# Patient Record
Sex: FEMALE | Race: WHITE | Employment: UNEMPLOYED | URBAN - METROPOLITAN AREA
[De-identification: names, ages, dates, MRNs, and addresses within clinical notes are randomized per-mention and may not be internally consistent; named-entity substitution may affect disease eponyms.]

---

## 2019-09-10 ENCOUNTER — OFFICE VISIT (OUTPATIENT)
Dept: FAMILY MEDICINE CLINIC | Age: 59
End: 2019-09-10
Payer: COMMERCIAL

## 2019-09-10 VITALS
DIASTOLIC BLOOD PRESSURE: 103 MMHG | HEART RATE: 84 BPM | SYSTOLIC BLOOD PRESSURE: 170 MMHG | HEIGHT: 63 IN | TEMPERATURE: 97.7 F | RESPIRATION RATE: 16 BRPM | WEIGHT: 264.2 LBS | OXYGEN SATURATION: 99 % | BODY MASS INDEX: 46.81 KG/M2

## 2019-09-10 DIAGNOSIS — R42 DIZZINESS: ICD-10-CM

## 2019-09-10 DIAGNOSIS — Z23 NEED FOR 23-POLYVALENT PNEUMOCOCCAL POLYSACCHARIDE VACCINE: ICD-10-CM

## 2019-09-10 DIAGNOSIS — L40.9 PSORIASIS: ICD-10-CM

## 2019-09-10 DIAGNOSIS — I10 ESSENTIAL HYPERTENSION: Primary | ICD-10-CM

## 2019-09-10 PROCEDURE — 3017F COLORECTAL CA SCREEN DOC REV: CPT | Performed by: FAMILY MEDICINE

## 2019-09-10 PROCEDURE — 93000 ELECTROCARDIOGRAM COMPLETE: CPT | Performed by: FAMILY MEDICINE

## 2019-09-10 PROCEDURE — 99203 OFFICE O/P NEW LOW 30 MIN: CPT | Performed by: FAMILY MEDICINE

## 2019-09-10 PROCEDURE — G8427 DOCREV CUR MEDS BY ELIG CLIN: HCPCS | Performed by: FAMILY MEDICINE

## 2019-09-10 PROCEDURE — G8417 CALC BMI ABV UP PARAM F/U: HCPCS | Performed by: FAMILY MEDICINE

## 2019-09-10 PROCEDURE — 1036F TOBACCO NON-USER: CPT | Performed by: FAMILY MEDICINE

## 2019-09-10 RX ORDER — LISINOPRIL 5 MG/1
TABLET ORAL
Qty: 60 TABLET | Refills: 0 | Status: SHIPPED | OUTPATIENT
Start: 2019-09-10 | End: 2019-09-25

## 2019-09-10 ASSESSMENT — ENCOUNTER SYMPTOMS
SHORTNESS OF BREATH: 0
ABDOMINAL PAIN: 0
PHOTOPHOBIA: 0
COLOR CHANGE: 1

## 2019-09-10 ASSESSMENT — PATIENT HEALTH QUESTIONNAIRE - PHQ9
1. LITTLE INTEREST OR PLEASURE IN DOING THINGS: 0
SUM OF ALL RESPONSES TO PHQ9 QUESTIONS 1 & 2: 0
SUM OF ALL RESPONSES TO PHQ QUESTIONS 1-9: 0
SUM OF ALL RESPONSES TO PHQ QUESTIONS 1-9: 0
2. FEELING DOWN, DEPRESSED OR HOPELESS: 0

## 2019-09-10 NOTE — PROGRESS NOTES
Subjective:      Patient ID: John Doherty is a 61 y.o. female    Hypertension   This is a new problem. The current episode started more than 1 year ago. Pertinent negatives include no shortness of breath. Past treatments include nothing. Dizziness   Associated symptoms include a rash. Pertinent negatives include no abdominal pain, arthralgias or weakness. Here to establish. Has had about a week hx of intermittent dizziness. Seemed to start when rolling over in bed. Dizziness has gotten better last 3 days. No nausea or emesis. No vision changes. No weakness of arms or legs. Did have dental cleaning done prior to start of dizziness. No fever. No recent illness. No recent medical care as she had no medical insurance. Would like to see dermatologist as does have long hx of psoriasis. Review of Systems   Constitutional: Negative for unexpected weight change. Eyes: Negative for photophobia. Respiratory: Negative for shortness of breath. Gastrointestinal: Negative for abdominal pain. Musculoskeletal: Negative for arthralgias. Skin: Positive for color change and rash. Neurological: Positive for dizziness. Negative for syncope and weakness.      Reviewed allergy, medical, social, surgical, family and med list changes and updated   Files     Social History     Socioeconomic History    Marital status:      Spouse name: None    Number of children: None    Years of education: None    Highest education level: None   Occupational History    None   Social Needs    Financial resource strain: None    Food insecurity:     Worry: None     Inability: None    Transportation needs:     Medical: None     Non-medical: None   Tobacco Use    Smoking status: Former Smoker     Packs/day: 1.00     Years: 7.00     Pack years: 7.00     Types: Cigarettes     Last attempt to quit: 1984     Years since quittin.7    Smokeless tobacco: Never Used   Substance and Sexual Activity   

## 2019-09-13 DIAGNOSIS — R42 DIZZINESS: ICD-10-CM

## 2019-09-13 DIAGNOSIS — I10 ESSENTIAL HYPERTENSION: ICD-10-CM

## 2019-09-13 DIAGNOSIS — L40.9 PSORIASIS: ICD-10-CM

## 2019-09-13 LAB
ALBUMIN SERPL-MCNC: 3.7 G/DL (ref 3.5–4.6)
ALP BLD-CCNC: 82 U/L (ref 40–130)
ALT SERPL-CCNC: 13 U/L (ref 0–33)
ANION GAP SERPL CALCULATED.3IONS-SCNC: 10 MEQ/L (ref 9–15)
AST SERPL-CCNC: 17 U/L (ref 0–35)
BASOPHILS ABSOLUTE: 0.1 K/UL (ref 0–0.2)
BASOPHILS RELATIVE PERCENT: 0.8 %
BILIRUB SERPL-MCNC: 0.4 MG/DL (ref 0.2–0.7)
BUN BLDV-MCNC: 14 MG/DL (ref 6–20)
CALCIUM SERPL-MCNC: 9.3 MG/DL (ref 8.5–9.9)
CHLORIDE BLD-SCNC: 105 MEQ/L (ref 95–107)
CHOLESTEROL, TOTAL: 202 MG/DL (ref 0–199)
CO2: 26 MEQ/L (ref 20–31)
CREAT SERPL-MCNC: 0.55 MG/DL (ref 0.5–0.9)
EOSINOPHILS ABSOLUTE: 0.5 K/UL (ref 0–0.7)
EOSINOPHILS RELATIVE PERCENT: 7.4 %
GFR AFRICAN AMERICAN: >60
GFR NON-AFRICAN AMERICAN: >60
GLOBULIN: 3.3 G/DL (ref 2.3–3.5)
GLUCOSE BLD-MCNC: 89 MG/DL (ref 70–99)
HCT VFR BLD CALC: 40.7 % (ref 37–47)
HDLC SERPL-MCNC: 53 MG/DL (ref 40–59)
HEMOGLOBIN: 13 G/DL (ref 12–16)
LDL CHOLESTEROL CALCULATED: 124 MG/DL (ref 0–129)
LYMPHOCYTES ABSOLUTE: 2.5 K/UL (ref 1–4.8)
LYMPHOCYTES RELATIVE PERCENT: 37.9 %
MCH RBC QN AUTO: 30.1 PG (ref 27–31.3)
MCHC RBC AUTO-ENTMCNC: 32 % (ref 33–37)
MCV RBC AUTO: 94.2 FL (ref 82–100)
MONOCYTES ABSOLUTE: 0.4 K/UL (ref 0.2–0.8)
MONOCYTES RELATIVE PERCENT: 6.6 %
NEUTROPHILS ABSOLUTE: 3.1 K/UL (ref 1.4–6.5)
NEUTROPHILS RELATIVE PERCENT: 47.3 %
PDW BLD-RTO: 14.4 % (ref 11.5–14.5)
PLATELET # BLD: 214 K/UL (ref 130–400)
POTASSIUM SERPL-SCNC: 4.2 MEQ/L (ref 3.4–4.9)
RBC # BLD: 4.32 M/UL (ref 4.2–5.4)
SODIUM BLD-SCNC: 141 MEQ/L (ref 135–144)
TOTAL PROTEIN: 7 G/DL (ref 6.3–8)
TRIGL SERPL-MCNC: 125 MG/DL (ref 0–150)
WBC # BLD: 6.5 K/UL (ref 4.8–10.8)

## 2019-09-25 ENCOUNTER — OFFICE VISIT (OUTPATIENT)
Dept: FAMILY MEDICINE CLINIC | Age: 59
End: 2019-09-25
Payer: COMMERCIAL

## 2019-09-25 VITALS
SYSTOLIC BLOOD PRESSURE: 115 MMHG | HEIGHT: 63 IN | OXYGEN SATURATION: 98 % | HEART RATE: 90 BPM | RESPIRATION RATE: 16 BRPM | BODY MASS INDEX: 46.78 KG/M2 | WEIGHT: 264 LBS | DIASTOLIC BLOOD PRESSURE: 72 MMHG | TEMPERATURE: 97.6 F

## 2019-09-25 DIAGNOSIS — E78.00 PURE HYPERCHOLESTEROLEMIA: ICD-10-CM

## 2019-09-25 DIAGNOSIS — Z12.11 COLON CANCER SCREENING: ICD-10-CM

## 2019-09-25 DIAGNOSIS — I10 ESSENTIAL HYPERTENSION: Primary | ICD-10-CM

## 2019-09-25 DIAGNOSIS — Z12.39 BREAST CANCER SCREENING: ICD-10-CM

## 2019-09-25 PROCEDURE — 99213 OFFICE O/P EST LOW 20 MIN: CPT | Performed by: FAMILY MEDICINE

## 2019-09-25 PROCEDURE — G8417 CALC BMI ABV UP PARAM F/U: HCPCS | Performed by: FAMILY MEDICINE

## 2019-09-25 PROCEDURE — 1036F TOBACCO NON-USER: CPT | Performed by: FAMILY MEDICINE

## 2019-09-25 PROCEDURE — 3017F COLORECTAL CA SCREEN DOC REV: CPT | Performed by: FAMILY MEDICINE

## 2019-09-25 PROCEDURE — G8427 DOCREV CUR MEDS BY ELIG CLIN: HCPCS | Performed by: FAMILY MEDICINE

## 2019-09-25 RX ORDER — CLOBETASOL PROPIONATE 0.5 MG/G
OINTMENT TOPICAL
Refills: 2 | COMMUNITY
Start: 2019-09-16

## 2019-09-25 RX ORDER — NEOMYCIN SULFATE, POLYMYXIN B SULFATE AND DEXAMETHASONE 3.5; 10000; 1 MG/ML; [USP'U]/ML; MG/ML
SUSPENSION/ DROPS OPHTHALMIC
Refills: 0 | COMMUNITY
Start: 2019-09-17 | End: 2020-08-13

## 2019-09-25 RX ORDER — LISINOPRIL 10 MG/1
10 TABLET ORAL DAILY
Qty: 30 TABLET | Refills: 0 | Status: SHIPPED | OUTPATIENT
Start: 2019-09-25 | End: 2019-11-04 | Stop reason: SDUPTHER

## 2019-09-25 ASSESSMENT — ENCOUNTER SYMPTOMS
SHORTNESS OF BREATH: 0
ABDOMINAL PAIN: 0

## 2019-09-25 NOTE — PROGRESS NOTES
MG tablet     Sig: Take 1 tablet by mouth daily     Dispense:  30 tablet     Refill:  0      F/u after non fasting blood work in 4 weeks

## 2019-10-21 DIAGNOSIS — I10 ESSENTIAL HYPERTENSION: ICD-10-CM

## 2019-10-21 LAB
ANION GAP SERPL CALCULATED.3IONS-SCNC: 13 MEQ/L (ref 9–15)
BUN BLDV-MCNC: 20 MG/DL (ref 6–20)
CALCIUM SERPL-MCNC: 9.1 MG/DL (ref 8.5–9.9)
CHLORIDE BLD-SCNC: 99 MEQ/L (ref 95–107)
CO2: 28 MEQ/L (ref 20–31)
CREAT SERPL-MCNC: 0.62 MG/DL (ref 0.5–0.9)
GFR AFRICAN AMERICAN: >60
GFR NON-AFRICAN AMERICAN: >60
GLUCOSE BLD-MCNC: 85 MG/DL (ref 70–99)
POTASSIUM SERPL-SCNC: 4.3 MEQ/L (ref 3.4–4.9)
SODIUM BLD-SCNC: 140 MEQ/L (ref 135–144)

## 2019-10-29 ENCOUNTER — OFFICE VISIT (OUTPATIENT)
Dept: FAMILY MEDICINE CLINIC | Age: 59
End: 2019-10-29

## 2019-10-29 DIAGNOSIS — F48.9 NO DIAGNOSIS ON AXIS I: Primary | ICD-10-CM

## 2019-11-04 DIAGNOSIS — I10 HYPERTENSION, UNSPECIFIED TYPE: Primary | ICD-10-CM

## 2019-11-04 RX ORDER — LISINOPRIL 10 MG/1
10 TABLET ORAL DAILY
Qty: 30 TABLET | Refills: 2 | Status: SHIPPED | OUTPATIENT
Start: 2019-11-04 | End: 2019-12-17 | Stop reason: SDUPTHER

## 2019-12-12 ENCOUNTER — TELEPHONE (OUTPATIENT)
Dept: ADMINISTRATIVE | Age: 59
End: 2019-12-12

## 2019-12-17 ENCOUNTER — OFFICE VISIT (OUTPATIENT)
Dept: FAMILY MEDICINE CLINIC | Age: 59
End: 2019-12-17
Payer: COMMERCIAL

## 2019-12-17 VITALS
HEIGHT: 63 IN | HEART RATE: 90 BPM | TEMPERATURE: 97.8 F | OXYGEN SATURATION: 97 % | WEIGHT: 264 LBS | BODY MASS INDEX: 46.78 KG/M2 | DIASTOLIC BLOOD PRESSURE: 72 MMHG | SYSTOLIC BLOOD PRESSURE: 120 MMHG | RESPIRATION RATE: 16 BRPM

## 2019-12-17 DIAGNOSIS — I10 HYPERTENSION, UNSPECIFIED TYPE: ICD-10-CM

## 2019-12-17 LAB
BILIRUBIN, POC: NORMAL
BLOOD URINE, POC: NORMAL
CLARITY, POC: CLEAR
COLOR, POC: YELLOW
GLUCOSE URINE, POC: NORMAL
KETONES, POC: NORMAL
LEUKOCYTE EST, POC: NORMAL
NITRITE, POC: NORMAL
PH, POC: 6
PROTEIN, POC: NORMAL
SPECIFIC GRAVITY, POC: 1.02
UROBILINOGEN, POC: 3.5

## 2019-12-17 PROCEDURE — 81002 URINALYSIS NONAUTO W/O SCOPE: CPT | Performed by: FAMILY MEDICINE

## 2019-12-17 PROCEDURE — 99213 OFFICE O/P EST LOW 20 MIN: CPT | Performed by: FAMILY MEDICINE

## 2019-12-17 RX ORDER — LISINOPRIL 10 MG/1
10 TABLET ORAL DAILY
Qty: 30 TABLET | Refills: 5 | Status: SHIPPED | OUTPATIENT
Start: 2019-12-17 | End: 2020-06-15 | Stop reason: SDUPTHER

## 2020-01-14 ENCOUNTER — OFFICE VISIT (OUTPATIENT)
Dept: FAMILY MEDICINE CLINIC | Age: 60
End: 2020-01-14
Payer: COMMERCIAL

## 2020-01-14 VITALS
DIASTOLIC BLOOD PRESSURE: 72 MMHG | HEIGHT: 64 IN | WEIGHT: 273.6 LBS | SYSTOLIC BLOOD PRESSURE: 114 MMHG | TEMPERATURE: 97.8 F | OXYGEN SATURATION: 98 % | HEART RATE: 87 BPM | BODY MASS INDEX: 46.71 KG/M2 | RESPIRATION RATE: 16 BRPM

## 2020-01-14 PROCEDURE — 99213 OFFICE O/P EST LOW 20 MIN: CPT | Performed by: PHYSICIAN ASSISTANT

## 2020-01-14 ASSESSMENT — ENCOUNTER SYMPTOMS
EYE PAIN: 0
NAUSEA: 0
BACK PAIN: 1
WHEEZING: 0
SHORTNESS OF BREATH: 0
DIARRHEA: 0
VOMITING: 0
SORE THROAT: 0
CONSTIPATION: 0
COUGH: 0

## 2020-01-14 NOTE — PROGRESS NOTES
Subjective  Farshad Lundberg, 61 y.o. female presents today with:  Chief Complaint   Patient presents with    Rash     Patient c/o rash on her stomach x4 days. Patient states it is painful. Patient states the only thing different that she took is metamucil. Patient states the pain travels up her back.  Health Maintenance     Declines all will f/u with dr. Rikki He       HPI   Patient is here being seen acutely for complaint of a rash on left upper abdomen that she noticed about 2 days ago   she  has eczema but states this rash has a  different appearance - it is itchy but not burning having a separate pain in her left upper back which feels heavy  She has been having some GI complaints belching frequently bloated states she can feel her bowels moving. denies constipation using Metamucil for the last 2 to 3 days - no change in sxs    Review of Systems   Constitutional: Negative for fatigue. HENT: Negative for congestion, ear pain and sore throat. Eyes: Negative for pain. Respiratory: Negative for cough, shortness of breath and wheezing. Gastrointestinal: Negative for constipation, diarrhea, nausea and vomiting. Genitourinary: Negative for dysuria. Musculoskeletal: Positive for back pain. Negative for neck pain. Skin: Positive for rash. Allergic/Immunologic: Negative for environmental allergies and food allergies. Neurological: Negative for dizziness and headaches. Psychiatric/Behavioral: Negative for sleep disturbance. No past medical history on file. No past surgical history on file.   Social History     Socioeconomic History    Marital status:      Spouse name: Not on file    Number of children: Not on file    Years of education: Not on file    Highest education level: Not on file   Occupational History    Not on file   Social Needs    Financial resource strain: Not on file    Food insecurity:     Worry: Not on file     Inability: Not on file   [employer] SpO2: 98%   Weight: 273 lb 9.6 oz (124.1 kg)   Height: 5' 4\" (1.626 m)     Physical Exam  Constitutional:       Comments: Morbid obesity   HENT:      Head: Normocephalic and atraumatic. Eyes:      Extraocular Movements: Extraocular movements intact. Conjunctiva/sclera: Conjunctivae normal.      Pupils: Pupils are equal, round, and reactive to light. Cardiovascular:      Rate and Rhythm: Normal rate and regular rhythm. Pulmonary:      Effort: Pulmonary effort is normal. No respiratory distress. Breath sounds: Normal breath sounds. No wheezing or rhonchi. Abdominal:      Palpations: There is no mass. Tenderness: There is no tenderness. Musculoskeletal:         General: No swelling, tenderness or deformity. Skin:     General: Skin is warm and dry. Findings: Erythema and rash present. Comments: Erythematous vesicular  rash left upper abdomen   Neurological:      General: No focal deficit present. Mental Status: She is alert and oriented to person, place, and time. Psychiatric:         Mood and Affect: Mood is anxious. Assessment & Plan    Diagnosis Orders   1. Generalized abdominal pain  XR ABDOMEN (complete, including decubitus and/or erect views)   2. Herpes zoster without complication      Patient declined treatment         Orders Placed This Encounter   Procedures    XR ABDOMEN (complete, including decubitus and/or erect views)     Standing Status:   Future     Number of Occurrences:   1     Standing Expiration Date:   1/14/2021     Order Specific Question:   Reason for exam:     Answer:   abdominal discomfort     No orders of the defined types were placed in this encounter. There are no discontinued medications. Return for follow up with your PCP as directed, call for results.     Francia Rome PA-C

## 2020-06-15 ENCOUNTER — OFFICE VISIT (OUTPATIENT)
Dept: FAMILY MEDICINE CLINIC | Age: 60
End: 2020-06-15
Payer: COMMERCIAL

## 2020-06-15 VITALS
DIASTOLIC BLOOD PRESSURE: 80 MMHG | HEIGHT: 64 IN | HEART RATE: 79 BPM | BODY MASS INDEX: 47.12 KG/M2 | RESPIRATION RATE: 14 BRPM | OXYGEN SATURATION: 98 % | TEMPERATURE: 97.6 F | SYSTOLIC BLOOD PRESSURE: 118 MMHG | WEIGHT: 276 LBS

## 2020-06-15 PROCEDURE — 99214 OFFICE O/P EST MOD 30 MIN: CPT | Performed by: FAMILY MEDICINE

## 2020-06-15 RX ORDER — LISINOPRIL 10 MG/1
10 TABLET ORAL DAILY
Qty: 30 TABLET | Refills: 5 | Status: SHIPPED | OUTPATIENT
Start: 2020-06-15 | End: 2021-02-09

## 2020-06-15 ASSESSMENT — PATIENT HEALTH QUESTIONNAIRE - PHQ9
1. LITTLE INTEREST OR PLEASURE IN DOING THINGS: 0
2. FEELING DOWN, DEPRESSED OR HOPELESS: 0
SUM OF ALL RESPONSES TO PHQ9 QUESTIONS 1 & 2: 0
SUM OF ALL RESPONSES TO PHQ QUESTIONS 1-9: 0
SUM OF ALL RESPONSES TO PHQ QUESTIONS 1-9: 0

## 2020-06-15 ASSESSMENT — ENCOUNTER SYMPTOMS
ABDOMINAL PAIN: 0
COUGH: 0
NAUSEA: 0
VOMITING: 0

## 2020-06-15 NOTE — PROGRESS NOTES
Inability: None    Transportation needs     Medical: None     Non-medical: None   Tobacco Use    Smoking status: Former Smoker     Packs/day: 1.00     Years: 7.00     Pack years: 7.00     Types: Cigarettes     Last attempt to quit: 1984     Years since quittin.4    Smokeless tobacco: Never Used   Substance and Sexual Activity    Alcohol use: None    Drug use: None    Sexual activity: None   Lifestyle    Physical activity     Days per week: None     Minutes per session: None    Stress: None   Relationships    Social connections     Talks on phone: None     Gets together: None     Attends Moravian service: None     Active member of club or organization: None     Attends meetings of clubs or organizations: None     Relationship status: None    Intimate partner violence     Fear of current or ex partner: None     Emotionally abused: None     Physically abused: None     Forced sexual activity: None   Other Topics Concern    None   Social History Narrative    None     Current Outpatient Medications   Medication Sig Dispense Refill    lisinopril (PRINIVIL;ZESTRIL) 10 MG tablet Take 1 tablet by mouth daily 30 tablet 5    clobetasol (TEMOVATE) 0.05 % ointment Apply to arms & legs twice a day for up to 3 weeks, take 1 week off. Repeat for flare  2    neomycin-polymyxin-dexameth (MAXITROL) 3.5-40259-9.1 ophthalmic suspension Instill 1 (ONE) drop into eye(s) FOUR TIMES DAILY for 1 (ONE) week & taper AS DIRECTED  0    Multiple Vitamins-Minerals (ONE-A-DAY WOMENS 50+ ADVANTAGE PO) Take by mouth       No current facility-administered medications for this visit. History reviewed. No pertinent family history. History reviewed. No pertinent past medical history. Objective:   Physical Exam  Neck:no carotid bruits. No masses. No adenopathy. No thyroid asymmetry. Lungs:clear and equal breath sounds. No wheezes or rales. Heart:rate reg. No murmur.   No gallops   Pulses:Radials 2+ equal

## 2020-06-29 ENCOUNTER — TELEPHONE (OUTPATIENT)
Dept: FAMILY MEDICINE CLINIC | Age: 60
End: 2020-06-29

## 2020-06-30 NOTE — TELEPHONE ENCOUNTER
If not able to create order from front office-, initial  Message or request  can be sent to ma in future  so that order can be   Placed and then submitted for approval by me ---other wise multiple messages are placed and sometimes several days go by without anything being ordered.  -thanks

## 2020-07-22 ENCOUNTER — HOSPITAL ENCOUNTER (OUTPATIENT)
Dept: ULTRASOUND IMAGING | Age: 60
Discharge: HOME | End: 2020-07-24
Payer: COMMERCIAL

## 2020-07-22 ENCOUNTER — HOSPITAL ENCOUNTER (OUTPATIENT)
Dept: WOMENS IMAGING | Age: 60
Discharge: HOME | End: 2020-07-24
Payer: COMMERCIAL

## 2020-07-22 PROCEDURE — 76705 ECHO EXAM OF ABDOMEN: CPT

## 2020-07-22 PROCEDURE — 77067 SCR MAMMO BI INCL CAD: CPT

## 2020-08-13 ENCOUNTER — OFFICE VISIT (OUTPATIENT)
Dept: OBGYN CLINIC | Age: 60
End: 2020-08-13
Payer: COMMERCIAL

## 2020-08-13 VITALS
SYSTOLIC BLOOD PRESSURE: 118 MMHG | DIASTOLIC BLOOD PRESSURE: 78 MMHG | BODY MASS INDEX: 51.07 KG/M2 | RESPIRATION RATE: 20 BRPM | HEIGHT: 63 IN | HEART RATE: 84 BPM | WEIGHT: 288.2 LBS

## 2020-08-13 PROCEDURE — 99386 PREV VISIT NEW AGE 40-64: CPT | Performed by: OBSTETRICS & GYNECOLOGY

## 2020-08-13 RX ORDER — ACETAMINOPHEN 500 MG
1000 TABLET ORAL EVERY 6 HOURS PRN
COMMUNITY

## 2020-08-13 ASSESSMENT — ENCOUNTER SYMPTOMS
VOMITING: 0
BLOOD IN STOOL: 0
RESPIRATORY NEGATIVE: 1
CONSTIPATION: 0
ABDOMINAL DISTENTION: 0
DIARRHEA: 0
EYES NEGATIVE: 1
NAUSEA: 0
ALLERGIC/IMMUNOLOGIC NEGATIVE: 1
ABDOMINAL PAIN: 0
ANAL BLEEDING: 0
RECTAL PAIN: 0

## 2020-08-13 NOTE — PROGRESS NOTES
Subjective:      Patient ID: Kishore Huertas is a 61 y.o. female    Annual exam. New patient; reviewed medical, surgical, social and family history. Also reviewed current medications and allergies. No PMB. Pap performed and screening mammogram ordered. Monthly SBE encouraged. Screening colonoscopy recommended per routine. F/U annual exam or prn. Pt also wished to discuss rash in groin folds and 's h/o HSV extending her appointment time by 15 minutes. Discussed HSV pathophysiology and transmission. All questions answered. Patient without h/o outbreaks. Also discussed fungal infections and odor under pannus. Discussed keeping area clean and dry and anti-fungal powders. All questions answered. Vitals:  /78   Pulse 84   Resp 20   Ht 5' 3\" (1.6 m)   Wt 288 lb 3.2 oz (130.7 kg)   LMP 01/01/2015 (Within Months)   BMI 51.05 kg/m²   History reviewed. No pertinent past medical history. Past Surgical History:   Procedure Laterality Date    CARPAL TUNNEL RELEASE      TONSILLECTOMY       Allergies:  Patient has no known allergies. Current Outpatient Medications   Medication Sig Dispense Refill    acetaminophen (TYLENOL) 500 MG tablet Take 1,000 mg by mouth every 6 hours as needed for Pain      lisinopril (PRINIVIL;ZESTRIL) 10 MG tablet Take 1 tablet by mouth daily 30 tablet 5    clobetasol (TEMOVATE) 0.05 % ointment Apply to arms & legs twice a day for up to 3 weeks, take 1 week off. Repeat for flare  2    Multiple Vitamins-Minerals (ONE-A-DAY WOMENS 50+ ADVANTAGE PO) Take by mouth       No current facility-administered medications for this visit.       Social History     Socioeconomic History    Marital status:      Spouse name: Not on file    Number of children: Not on file    Years of education: Not on file    Highest education level: Not on file   Occupational History    Not on file   Social Needs    Financial resource strain: Not on file    Food insecurity Worry: Not on file     Inability: Not on file    Transportation needs     Medical: Not on file     Non-medical: Not on file   Tobacco Use    Smoking status: Former Smoker     Packs/day: 1.00     Years: 7.00     Pack years: 7.00     Types: Cigarettes     Last attempt to quit: 1984     Years since quittin.6    Smokeless tobacco: Never Used   Substance and Sexual Activity    Alcohol use: Not Currently    Drug use: Never    Sexual activity: Yes     Partners: Male   Lifestyle    Physical activity     Days per week: Not on file     Minutes per session: Not on file    Stress: Not on file   Relationships    Social connections     Talks on phone: Not on file     Gets together: Not on file     Attends Adventist service: Not on file     Active member of club or organization: Not on file     Attends meetings of clubs or organizations: Not on file     Relationship status: Not on file    Intimate partner violence     Fear of current or ex partner: Not on file     Emotionally abused: Not on file     Physically abused: Not on file     Forced sexual activity: Not on file   Other Topics Concern    Not on file   Social History Narrative    Not on file     Family History   Problem Relation Age of Onset    Heart Attack Paternal Grandmother     Stroke Maternal Grandmother     Cancer Maternal Grandmother     Heart Disease Father     Hypertension Father     Diabetes Mother     Heart Disease Mother     Liver Cancer Brother     Diabetes Sister     Heart Defect Sister        Review of Systems   Constitutional: Negative. Negative for activity change, appetite change, chills, diaphoresis, fatigue, fever and unexpected weight change. HENT: Negative. Eyes: Negative. Respiratory: Negative. Cardiovascular: Negative. Gastrointestinal: Negative for abdominal distention, abdominal pain, anal bleeding, blood in stool, constipation, diarrhea, nausea, rectal pain and vomiting. Endocrine: Negative. Genitourinary: Negative for decreased urine volume, difficulty urinating, dyspareunia, dysuria, enuresis, flank pain, frequency, genital sores, hematuria, menstrual problem, pelvic pain, urgency, vaginal bleeding, vaginal discharge and vaginal pain. Musculoskeletal: Negative. Skin: Positive for rash. Allergic/Immunologic: Negative. Neurological: Negative. Hematological: Negative. Psychiatric/Behavioral: Negative. Objective:     Physical Exam  Constitutional:       Appearance: She is well-developed. HENT:      Head: Normocephalic. Neck:      Musculoskeletal: Normal range of motion and neck supple. Thyroid: No thyromegaly. Cardiovascular:      Rate and Rhythm: Normal rate and regular rhythm. Heart sounds: Normal heart sounds. Pulmonary:      Effort: Pulmonary effort is normal. No respiratory distress. Breath sounds: Normal breath sounds. No wheezing or rales. Chest:      Chest wall: No tenderness. Breasts:         Right: No mass, nipple discharge, skin change or tenderness. Left: No mass, nipple discharge, skin change or tenderness. Abdominal:      General: Bowel sounds are normal. There is no distension. Palpations: Abdomen is soft. There is no mass. Tenderness: There is no abdominal tenderness. There is no guarding or rebound. Hernia: There is no hernia in the left inguinal area. Genitourinary:     Labia:         Right: No rash, tenderness, lesion or injury. Left: No rash, tenderness, lesion or injury. Vagina: Normal. No signs of injury and foreign body. No vaginal discharge, erythema, tenderness or bleeding. Cervix: No cervical motion tenderness, discharge or friability. Uterus: Not deviated, not enlarged, not fixed and not tender. Adnexa:         Right: No mass, tenderness or fullness. Left: No mass, tenderness or fullness. Rectum: Normal.   Musculoskeletal: Normal range of motion. General: No tenderness. Lymphadenopathy:      Cervical: No cervical adenopathy. Skin:     General: Skin is warm and dry. Coloration: Skin is not pale. Findings: No erythema or rash. Neurological:      Mental Status: She is alert and oriented to person, place, and time. Psychiatric:         Behavior: Behavior normal.         Thought Content: Thought content normal.         Judgment: Judgment normal.         Assessment:       Diagnosis Orders   1. Women's annual routine gynecological examination  PAP SMEAR   2. Screening for human papillomavirus  PAP SMEAR   3. Screening mammogram, encounter for  ROB DIGITAL SCREEN W OR WO CAD BILATERAL   4. Osteoporosis screening  DEXA BONE DENSITY AXIAL SKELETON   5. Postmenopausal  DEXA BONE DENSITY AXIAL SKELETON        Plan:      Medications placedthis encounter:  No orders of the defined types were placed in this encounter. Orders placedthis encounter:  Orders Placed This Encounter   Procedures    DEXA BONE DENSITY AXIAL SKELETON     Standing Status:   Future     Standing Expiration Date:   8/5/2021    ROB DIGITAL SCREEN W OR WO CAD BILATERAL     Standing Status:   Future     Standing Expiration Date:   8/13/2021    PAP SMEAR     Standing Status:   Future     Standing Expiration Date:   8/5/2021     Order Specific Question:   Collection Type     Answer: Thin Prep     Order Specific Question:   Prior Abnormal Pap Test     Answer:   No     Order Specific Question:   Screening or Diagnostic     Answer:   Screening     Order Specific Question:   HPV Requested? Answer:   Yes     Comments:   16/18     Order Specific Question:   High Risk Patient     Answer:   N/A         Follow up:  Return in about 1 year (around 8/13/2021) for Annual.   Repeat Annual GYN exam every 1 year. Cervical Cytology exam starts age 24. If Negative Cytology;  follow-up screening per current guidelines. Mammograms yearly starting at age 36.     Calcium and Vitamin D dosing reviewed ( age appropriate ). Colonoscopy and bone density screening discussed ( age appropriate ). Birth control and STD prevention discussed ( age appropriate ). Gardisil counseling completed for all patients 7-35 yo. Routine health maintenance ( per PCP and guidelines ).

## 2020-08-26 ENCOUNTER — APPOINTMENT (OUTPATIENT)
Dept: ULTRASOUND IMAGING | Age: 60
End: 2020-08-26
Payer: COMMERCIAL

## 2020-08-27 ENCOUNTER — TELEPHONE (OUTPATIENT)
Dept: FAMILY MEDICINE CLINIC | Age: 60
End: 2020-08-27

## 2020-08-27 ENCOUNTER — HOSPITAL ENCOUNTER (OUTPATIENT)
Dept: WOMENS IMAGING | Age: 60
Discharge: HOME | End: 2020-08-29
Payer: COMMERCIAL

## 2020-08-27 ENCOUNTER — HOSPITAL ENCOUNTER (OUTPATIENT)
Dept: ULTRASOUND IMAGING | Age: 60
Discharge: HOME | End: 2020-08-29
Payer: COMMERCIAL

## 2020-08-27 PROCEDURE — 76642 ULTRASOUND BREAST LIMITED: CPT

## 2020-08-27 PROCEDURE — G0279 TOMOSYNTHESIS, MAMMO: HCPCS

## 2020-08-27 NOTE — TELEPHONE ENCOUNTER
Krish Pat from [facility] called, pt was given an US after mammogram but there is no order, requesting you put an order in for Left Breast US Limited ; reason abnormal R92.8. Thank you.

## 2020-08-28 NOTE — TELEPHONE ENCOUNTER
Test is already done because radiologist wanted it done; the tech transcribed the order but the order it is not electronically signed, need a signed US Left Breast order and Rolemagdy Flores will attach to original.

## 2020-09-15 ENCOUNTER — VIRTUAL VISIT (OUTPATIENT)
Dept: FAMILY MEDICINE CLINIC | Age: 60
End: 2020-09-15
Payer: COMMERCIAL

## 2020-09-15 PROCEDURE — 99213 OFFICE O/P EST LOW 20 MIN: CPT | Performed by: FAMILY MEDICINE

## 2020-09-15 ASSESSMENT — PATIENT HEALTH QUESTIONNAIRE - PHQ9
SUM OF ALL RESPONSES TO PHQ QUESTIONS 1-9: 0
SUM OF ALL RESPONSES TO PHQ QUESTIONS 1-9: 0
1. LITTLE INTEREST OR PLEASURE IN DOING THINGS: 0
SUM OF ALL RESPONSES TO PHQ9 QUESTIONS 1 & 2: 0
2. FEELING DOWN, DEPRESSED OR HOPELESS: 0

## 2020-09-15 ASSESSMENT — ENCOUNTER SYMPTOMS
VOMITING: 0
NAUSEA: 0
ABDOMINAL DISTENTION: 0

## 2020-09-15 NOTE — PROGRESS NOTES
needed for Pain      lisinopril (PRINIVIL;ZESTRIL) 10 MG tablet Take 1 tablet by mouth daily 30 tablet 5    clobetasol (TEMOVATE) 0.05 % ointment Apply to arms & legs twice a day for up to 3 weeks, take 1 week off. Repeat for flare  2    Multiple Vitamins-Minerals (ONE-A-DAY WOMENS 50+ ADVANTAGE PO) Take by mouth       No current facility-administered medications for this visit. Family History   Problem Relation Age of Onset    Heart Attack Paternal Grandmother     Stroke Maternal Grandmother     Cancer Maternal Grandmother     Heart Disease Father     Hypertension Father     Diabetes Mother     Heart Disease Mother     Liver Cancer Brother     Diabetes Sister     Heart Defect Sister      Past Medical History:   Diagnosis Date    Hypertension     Obesity        TELEHEALTH EVALUATION -- Audio/Visual (During GDEIX-11 public health emergency)    -   Zina Hickey is a 61 y.o. female being evaluated by a Virtual Visit (video visit) encounter to address concerns as mentioned above. A caregiver was present when appropriate. Due to this being a TeleHealth encounter (During CHI-12 public health emergency), evaluation of the following organ systems was limited: Vitals/Constitutional/EENT/Resp/CV/GI//MS/Neuro/Skin/Heme-Lymph-Imm. Pursuant to the emergency declaration under the [address] [place] [address] authority and the [employer] Act, this Virtual Visit was conducted with patient's (and/or legal guardian's) consent, to reduce the patient's risk of exposure to COVID-19 and provide necessary medical care. The patient (and/or legal guardian) has also been advised to contact this office for worsening conditions or problems, and seek emergency medical treatment and/or call 911 if deemed necessary. patient accepts tele health video visit via [employer]. me and associated charges   Visit about 15 min   Services were provided through a video synchronous discussion virtually to substitute for in-person clinic visit. Type of encounter was _x_ Doxy __ MyChart ___Facetime    Patient was located at their home. Provider was located at their _x__ home or        ____ office. --Lencho Corbett MD on 9/15/2020 at 11:51 AM    An electronic signature was used to authenticate this note. Objective:   LMP 01/01/2015 (Within Months)     Physical Exam  Gen:  No acute distress  Mental status; Alert. Awake  Affect appropriate. Neuro; No focal deficits. No facial asymmetries; Assessment:       Diagnosis Orders   1. Calculus of gallbladder without cholecystitis without obstruction     2.  Colon cancer screening  Fara Choudhury MD, Gastroenterology, [place]         Plan:      Orders Placed This Encounter   Procedures   Erik Bustos MD, Gastroenterology, [place]     Referral Priority:   Routine     Referral Type:   Eval and Treat     Referral Reason:   Specialty Services Required     Referred to Provider:   Hilary May MD     Requested Specialty:   Gastroenterology     Number of Visits Requested:   1   patient would like to hold on surgery referral for gall bladder-no symptoms  Fasting blood work soon-if blood work stable -f/u in 6 months

## 2020-09-21 ENCOUNTER — TELEPHONE (OUTPATIENT)
Dept: ENDOSCOPY | Age: 60
End: 2020-09-21

## 2020-09-21 NOTE — TELEPHONE ENCOUNTER
called in prescription for Suprep bowel prep kit to [facility] in [facility] 9/21/2020 at 1:53pm

## 2020-09-24 DIAGNOSIS — E78.00 PURE HYPERCHOLESTEROLEMIA: ICD-10-CM

## 2020-09-24 DIAGNOSIS — I10 HYPERTENSION, UNSPECIFIED TYPE: ICD-10-CM

## 2020-09-24 LAB
ALBUMIN SERPL-MCNC: 3.8 G/DL (ref 3.5–4.6)
ALP BLD-CCNC: 81 U/L (ref 40–130)
ALT SERPL-CCNC: 17 U/L (ref 0–33)
ANION GAP SERPL CALCULATED.3IONS-SCNC: 13 MEQ/L (ref 9–15)
AST SERPL-CCNC: 24 U/L (ref 0–35)
BASOPHILS ABSOLUTE: 0.1 K/UL (ref 0–0.2)
BASOPHILS RELATIVE PERCENT: 0.8 %
BILIRUB SERPL-MCNC: <0.2 MG/DL (ref 0.2–0.7)
BUN BLDV-MCNC: 16 MG/DL (ref 8–23)
CALCIUM SERPL-MCNC: 9.2 MG/DL (ref 8.5–9.9)
CHLORIDE BLD-SCNC: 102 MEQ/L (ref 95–107)
CHOLESTEROL, TOTAL: 183 MG/DL (ref 0–199)
CO2: 26 MEQ/L (ref 20–31)
CREAT SERPL-MCNC: 0.56 MG/DL (ref 0.5–0.9)
EOSINOPHILS ABSOLUTE: 0.5 K/UL (ref 0–0.7)
EOSINOPHILS RELATIVE PERCENT: 7.7 %
GFR AFRICAN AMERICAN: >60
GFR NON-AFRICAN AMERICAN: >60
GLOBULIN: 3.1 G/DL (ref 2.3–3.5)
GLUCOSE BLD-MCNC: 95 MG/DL (ref 70–99)
HCT VFR BLD CALC: 40.7 % (ref 37–47)
HDLC SERPL-MCNC: 44 MG/DL (ref 40–59)
HEMOGLOBIN: 13.1 G/DL (ref 12–16)
LDL CHOLESTEROL CALCULATED: 107 MG/DL (ref 0–129)
LYMPHOCYTES ABSOLUTE: 2.5 K/UL (ref 1–4.8)
LYMPHOCYTES RELATIVE PERCENT: 37.5 %
MCH RBC QN AUTO: 29.6 PG (ref 27–31.3)
MCHC RBC AUTO-ENTMCNC: 32.2 % (ref 33–37)
MCV RBC AUTO: 92 FL (ref 82–100)
MONOCYTES ABSOLUTE: 0.6 K/UL (ref 0.2–0.8)
MONOCYTES RELATIVE PERCENT: 8.3 %
NEUTROPHILS ABSOLUTE: 3.1 K/UL (ref 1.4–6.5)
NEUTROPHILS RELATIVE PERCENT: 45.7 %
PDW BLD-RTO: 14.6 % (ref 11.5–14.5)
PLATELET # BLD: 202 K/UL (ref 130–400)
POTASSIUM SERPL-SCNC: 4.5 MEQ/L (ref 3.4–4.9)
RBC # BLD: 4.42 M/UL (ref 4.2–5.4)
SODIUM BLD-SCNC: 141 MEQ/L (ref 135–144)
TOTAL PROTEIN: 6.9 G/DL (ref 6.3–8)
TRIGL SERPL-MCNC: 162 MG/DL (ref 0–150)
WBC # BLD: 6.7 K/UL (ref 4.8–10.8)

## 2020-11-23 ENCOUNTER — TELEPHONE (OUTPATIENT)
Dept: FAMILY MEDICINE CLINIC | Age: 60
End: 2020-11-23

## 2020-12-08 ENCOUNTER — OFFICE VISIT (OUTPATIENT)
Dept: SURGERY | Age: 60
End: 2020-12-08
Payer: COMMERCIAL

## 2020-12-08 VITALS
TEMPERATURE: 96.7 F | WEIGHT: 293 LBS | OXYGEN SATURATION: 98 % | HEIGHT: 63 IN | BODY MASS INDEX: 51.91 KG/M2 | HEART RATE: 87 BPM

## 2020-12-08 PROCEDURE — 99203 OFFICE O/P NEW LOW 30 MIN: CPT | Performed by: COLON & RECTAL SURGERY

## 2020-12-08 ASSESSMENT — ENCOUNTER SYMPTOMS
DIARRHEA: 0
COLOR CHANGE: 0
ABDOMINAL PAIN: 0
VOMITING: 0
SHORTNESS OF BREATH: 0
CONSTIPATION: 0

## 2020-12-08 NOTE — PROGRESS NOTES
Subjective:      Patient ID: Nadine Albrecht is a 61 y.o. female who presents for:  Chief Complaint   Patient presents with   [facility] Patient       This is a 69-year-old female who had some left-sided abdominal pain in the past which has since resolved. She had a gallbladder ultrasound back in July which showed a mobile 1.5 cm gallstone present. She was referred for surgical evaluation. I reviewed liver function tests which were all normal.    She denies any abdominal pain. She has occasional acid reflux which she treats with Tums. She complains of intermittent bloating. She denies rectal bleeding. She denies unintentional weight loss. She has high blood pressure but no other issues. She has not had any abdominal operations. I reviewed her ultrasound personally with her.       Past Medical History:   Diagnosis Date    Hypertension     Obesity      Past Surgical History:   Procedure Laterality Date    CARPAL TUNNEL RELEASE      TONSILLECTOMY       Social History     Socioeconomic History    Marital status:      Spouse name: Not on file    Number of children: Not on file    Years of education: Not on file    Highest education level: Not on file   Occupational History    Not on file   Social Needs    Financial resource strain: Not on file    Food insecurity     Worry: Not on file     Inability: Not on file    Transportation needs     Medical: Not on file     Non-medical: Not on file   Tobacco Use    Smoking status: Former Smoker     Packs/day: 1.00     Years: 7.00     Pack years: 7.00     Types: Cigarettes     Last attempt to quit: 1984     Years since quittin.9    Smokeless tobacco: Never Used   Substance and Sexual Activity    Alcohol use: Not Currently    Drug use: Never    Sexual activity: Yes     Partners: Male   Lifestyle    Physical activity     Days per week: Not on file     Minutes per session: Not on file    Stress: Not on file   Relationships    Social connections     Talks on phone: Not on file     Gets together: Not on file     Attends Bahai service: Not on file     Active member of club or organization: Not on file     Attends meetings of clubs or organizations: Not on file     Relationship status: Not on file    Intimate partner violence     Fear of current or ex partner: Not on file     Emotionally abused: Not on file     Physically abused: Not on file     Forced sexual activity: Not on file   Other Topics Concern    Not on file   Social History Narrative    Not on file     Family History   Problem Relation Age of Onset    Heart Attack Paternal Grandmother     Stroke Maternal Grandmother     Cancer Maternal Grandmother     Heart Disease Father     Hypertension Father     Diabetes Mother     Heart Disease Mother     Liver Cancer Brother     Diabetes Sister     Heart Defect Sister      Allergies:  Patient has no known allergies. Review of Systems   Constitutional: Negative for activity change, appetite change and unexpected weight change. HENT: Negative for congestion. Respiratory: Negative for shortness of breath. Cardiovascular: Negative for chest pain. Gastrointestinal: Negative for abdominal pain, constipation, diarrhea and vomiting. Genitourinary: Negative for difficulty urinating. Musculoskeletal: Negative for arthralgias. Skin: Negative for color change. Neurological: Negative for dizziness and headaches. Hematological: Does not bruise/bleed easily. Psychiatric/Behavioral: Negative for agitation. Objective:    Pulse 87   Temp 96.7 °F (35.9 °C) (Temporal)   Ht 5' 3\" (1.6 m)   Wt 296 lb (134.3 kg)   LMP 01/01/2015 (Within Months)   SpO2 98%   BMI 52.43 kg/m²     Physical Exam  Constitutional:       Appearance: She is well-developed. HENT:      Head: Normocephalic and atraumatic. Eyes:      Pupils: Pupils are equal, round, and reactive to light.    Neck:      Musculoskeletal: Normal range of motion

## 2021-03-23 ENCOUNTER — OFFICE VISIT (OUTPATIENT)
Dept: FAMILY MEDICINE CLINIC | Age: 61
End: 2021-03-23
Payer: COMMERCIAL

## 2021-03-23 VITALS
BODY MASS INDEX: 51.91 KG/M2 | OXYGEN SATURATION: 95 % | HEIGHT: 63 IN | HEART RATE: 63 BPM | WEIGHT: 293 LBS | RESPIRATION RATE: 14 BRPM | DIASTOLIC BLOOD PRESSURE: 84 MMHG | TEMPERATURE: 98 F | SYSTOLIC BLOOD PRESSURE: 120 MMHG

## 2021-03-23 DIAGNOSIS — I10 HYPERTENSION, UNSPECIFIED TYPE: Primary | ICD-10-CM

## 2021-03-23 DIAGNOSIS — K21.9 GASTROESOPHAGEAL REFLUX DISEASE WITHOUT ESOPHAGITIS: ICD-10-CM

## 2021-03-23 DIAGNOSIS — Z12.11 COLON CANCER SCREENING: ICD-10-CM

## 2021-03-23 PROCEDURE — 99213 OFFICE O/P EST LOW 20 MIN: CPT | Performed by: FAMILY MEDICINE

## 2021-03-23 RX ORDER — PANTOPRAZOLE SODIUM 40 MG/1
40 TABLET, DELAYED RELEASE ORAL
Qty: 30 TABLET | Refills: 0 | Status: SHIPPED | OUTPATIENT
Start: 2021-03-23 | End: 2021-04-19

## 2021-03-23 RX ORDER — LISINOPRIL 10 MG/1
10 TABLET ORAL DAILY
Qty: 30 TABLET | Refills: 1 | Status: SHIPPED | OUTPATIENT
Start: 2021-03-23 | End: 2021-06-24

## 2021-03-23 ASSESSMENT — PATIENT HEALTH QUESTIONNAIRE - PHQ9
2. FEELING DOWN, DEPRESSED OR HOPELESS: 0
SUM OF ALL RESPONSES TO PHQ QUESTIONS 1-9: 0
SUM OF ALL RESPONSES TO PHQ QUESTIONS 1-9: 0

## 2021-03-23 ASSESSMENT — ENCOUNTER SYMPTOMS
VOMITING: 0
DIARRHEA: 0

## 2021-03-23 NOTE — PROGRESS NOTES
Subjective:      Patient ID: Fabiana Ladd is a 61 y.o. female. HPI  Here in follow up for htn. Has had long standing problems with intermittent gerd symptoms which have gotten worse over the last few months. No emesis. Did make some dietary changes which has helped.   k   Review of Systems   Constitutional: Negative for chills and fever. Cardiovascular: Negative for chest pain. Gastrointestinal: Negative for diarrhea and vomiting. Neurological: Negative for weakness. Treatment Adherence:   Medication compliance:  compliant most of the time  Diet compliance:  compliant most of the time  Weight trend: increasing  Current exercise: no regular exercise  Barriers: none    Hypertension:  Home blood pressure monitoring: No.  She is adherent to a low sodium diet. Patient denies chest pain. Antihypertensive medication side effects: no medication side effects noted. Use of agents associated with hypertension: none. Sodium (mEq/L)   Date Value   09/24/2020 141    BUN (mg/dL)   Date Value   09/24/2020 16    Glucose (mg/dL)   Date Value   09/24/2020 95      Potassium (mEq/L)   Date Value   09/24/2020 4.5    CREATININE (mg/dL)   Date Value   09/24/2020 0.56         Hyperlipidemia:  No new myalgias or GI upset on none.      Lab Results   Component Value Date    CHOL 183 09/24/2020    TRIG 162 (H) 09/24/2020    HDL 44 09/24/2020    LDLCALC 107 09/24/2020     Lab Results   Component Value Date    ALT 17 09/24/2020    AST 24 09/24/2020      Reviewed allergy, medical, social, surgical, family and med list changes and updated   files  Social History     Socioeconomic History    Marital status:      Spouse name: None    Number of children: None    Years of education: None    Highest education level: None   Occupational History    None   Social Needs    Financial resource strain: None    Food insecurity     Worry: None     Inability: None    Transportation needs Medical: None     Non-medical: None   Tobacco Use    Smoking status: Former Smoker     Packs/day: 1.00     Years: 7.00     Pack years: 7.00     Types: Cigarettes     Quit date: 1984     Years since quittin.2    Smokeless tobacco: Never Used   Substance and Sexual Activity    Alcohol use: Not Currently    Drug use: Never    Sexual activity: Yes     Partners: Male   Lifestyle    Physical activity     Days per week: None     Minutes per session: None    Stress: None   Relationships    Social connections     Talks on phone: None     Gets together: None     Attends Anabaptist service: None     Active member of club or organization: None     Attends meetings of clubs or organizations: None     Relationship status: None    Intimate partner violence     Fear of current or ex partner: None     Emotionally abused: None     Physically abused: None     Forced sexual activity: None   Other Topics Concern    None   Social History Narrative    None     Current Outpatient Medications   Medication Sig Dispense Refill    lisinopril (PRINIVIL;ZESTRIL) 10 MG tablet Take 1 tablet by mouth daily 30 tablet 0    acetaminophen (TYLENOL) 500 MG tablet Take 1,000 mg by mouth every 6 hours as needed for Pain      clobetasol (TEMOVATE) 0.05 % ointment Apply to arms & legs twice a day for up to 3 weeks, take 1 week off. Repeat for flare  2    Multiple Vitamins-Minerals (ONE-A-DAY WOMENS 50+ ADVANTAGE PO) Take by mouth       No current facility-administered medications for this visit.       Family History   Problem Relation Age of Onset    Heart Attack Paternal Grandmother     Stroke Maternal Grandmother     Cancer Maternal Grandmother     Heart Disease Father     Hypertension Father     Diabetes Mother     Heart Disease Mother     Liver Cancer Brother     Diabetes Sister     Heart Defect Sister      Past Medical History:   Diagnosis Date    Hypertension     Obesity      Objective:   Physical Exam  Neck:no carotid bruits. No masses. No adenopathy. No thyroid asymmetry. Lungs:clear and equal breath sounds. No wheezes or rales. Heart:rate reg. No murmur. No gallops   Pulses:Radials 2+ equal               Poster tib 1+ equal  Extremities: trace edema of both legs   Gen: In no acute distress  Abdomen; B.S present. Soft  Non tender. No hepatosplenomegaly. No masses   Assessment / Plan:       Diagnosis Orders   1. Hypertension, unspecified type  lisinopril (PRINIVIL;ZESTRIL) 10 MG tablet   2. Gastroesophageal reflux disease without esophagitis  Bárbara Perez MD, Gastroenterology, [place]   3.  Colon cancer screening  Rohit Benavides MD, Gastroenterology, [place]        Orders Placed This Encounter   Procedures    Lipid Panel     Standing Status:   Future     Standing Expiration Date:   3/23/2022     Order Specific Question:   Is Patient Fasting?/# of Hours     Answer:   9    Comprehensive Metabolic Panel     Standing Status:   Future     Standing Expiration Date:   3/23/2022    CBC Auto Differential     Standing Status:   Future     Standing Expiration Date:   3/23/2022   Noé Simmons MD, Gastroenterology, [place]     Referral Priority:   Routine     Referral Type:   Eval and Treat     Referral Reason:   Specialty Services Required     Referred to Provider:   Caryl Harman MD     Requested Specialty:   Gastroenterology     Number of Visits Requested:   1     Orders Placed This Encounter   Medications    lisinopril (PRINIVIL;ZESTRIL) 10 MG tablet     Sig: Take 1 tablet by mouth daily     Dispense:  30 tablet     Refill:  1    pantoprazole (PROTONIX) 40 MG tablet     Sig: Take 1 tablet by mouth every morning (before breakfast)     Dispense:  30 tablet     Refill:  0   f/u after fasting blood work in 6 months

## 2021-04-19 DIAGNOSIS — K21.9 GASTROESOPHAGEAL REFLUX DISEASE WITHOUT ESOPHAGITIS: Primary | ICD-10-CM

## 2021-04-19 RX ORDER — PANTOPRAZOLE SODIUM 40 MG/1
40 TABLET, DELAYED RELEASE ORAL
Qty: 30 TABLET | Refills: 0 | Status: SHIPPED | OUTPATIENT
Start: 2021-04-19 | End: 2022-05-05 | Stop reason: SDUPTHER

## 2021-10-05 DIAGNOSIS — I10 HYPERTENSION, UNSPECIFIED TYPE: ICD-10-CM

## 2021-10-06 RX ORDER — LISINOPRIL 10 MG/1
10 TABLET ORAL DAILY
Qty: 30 TABLET | Refills: 0 | Status: SHIPPED | OUTPATIENT
Start: 2021-10-06 | End: 2021-11-15 | Stop reason: SDUPTHER

## 2021-10-12 ENCOUNTER — TELEPHONE (OUTPATIENT)
Dept: OBGYN CLINIC | Age: 61
End: 2021-10-12

## 2021-10-18 NOTE — TELEPHONE ENCOUNTER
LMOM to schedule annual appointment Quality 47: Advance Care Plan: Advance Care Planning discussed and documented in the medical record; patient did not wish or was not able to name a surrogate decision maker or provide an advance care plan. Quality 110: Preventive Care And Screening: Influenza Immunization: Influenza Immunization Administered during Influenza season Detail Level: Detailed Quality 111:Pneumonia Vaccination Status For Older Adults: Pneumococcal Vaccination Previously Received

## 2021-11-15 DIAGNOSIS — I10 HYPERTENSION, UNSPECIFIED TYPE: ICD-10-CM

## 2021-11-15 RX ORDER — LISINOPRIL 10 MG/1
10 TABLET ORAL DAILY
Qty: 30 TABLET | Refills: 0 | Status: SHIPPED | OUTPATIENT
Start: 2021-11-15 | End: 2021-12-23

## 2021-11-15 NOTE — TELEPHONE ENCOUNTER
Requesting medication refill. Please approve or deny this request.    Rx requested:  Requested Prescriptions     Pending Prescriptions Disp Refills    lisinopril (PRINIVIL;ZESTRIL) 10 MG tablet 30 tablet 0     Sig: Take 1 tablet by mouth daily       Last Office Visit:   3/23/2021    Last Filled:      Last Labs:      Next Visit Date:  No future appointments.

## 2021-12-27 NOTE — TELEPHONE ENCOUNTER
[facility] Clinical Staff  Subject: Refill Request     QUESTIONS   Name of Medication? lisinopril (PRINIVIL;ZESTRIL) 10 MG tablet   Patient-reported dosage and instructions? 10mg 1 daily   How many days do you have left? 0   Preferred Pharmacy? [id number]   Pharmacy phone number (if available)? [phone]   Additional Information for Provider? pt took her last one this morning   needing refill   ---------------------------------------------------------------------------   --------------   CALL BACK INFO   What is the best way for the office to contact you? OK to leave message on   voicemail   Preferred Call Back Phone Number?  [phone]

## 2022-03-21 DIAGNOSIS — I10 HYPERTENSION, UNSPECIFIED TYPE: ICD-10-CM

## 2022-03-21 RX ORDER — LISINOPRIL 10 MG/1
10 TABLET ORAL DAILY
Qty: 30 TABLET | Refills: 0 | Status: SHIPPED | OUTPATIENT
Start: 2022-03-21 | End: 2022-04-29 | Stop reason: SDUPTHER

## 2022-03-21 NOTE — TELEPHONE ENCOUNTER
Pharmacy requesting medication refill. Please approve or deny this request.    Rx requested:  Requested Prescriptions     Pending Prescriptions Disp Refills    lisinopril (PRINIVIL;ZESTRIL) 10 MG tablet [Pharmacy Med Name: lisinopril 10 mg tablet] 30 tablet 0     Sig: TAKE 1 TABLET BY MOUTH DAILY         Last Office Visit:   3/23/2021      Next Visit Date:  No future appointments.

## 2022-04-29 ENCOUNTER — TELEPHONE (OUTPATIENT)
Dept: FAMILY MEDICINE CLINIC | Age: 62
End: 2022-04-29

## 2022-04-29 DIAGNOSIS — E78.00 PURE HYPERCHOLESTEROLEMIA: ICD-10-CM

## 2022-04-29 DIAGNOSIS — I10 HYPERTENSION, UNSPECIFIED TYPE: Primary | ICD-10-CM

## 2022-04-29 DIAGNOSIS — I10 HYPERTENSION, UNSPECIFIED TYPE: ICD-10-CM

## 2022-04-29 RX ORDER — LISINOPRIL 10 MG/1
10 TABLET ORAL DAILY
Qty: 30 TABLET | Refills: 0 | Status: SHIPPED | OUTPATIENT
Start: 2022-04-29 | End: 2022-05-31 | Stop reason: SDUPTHER

## 2022-04-29 NOTE — TELEPHONE ENCOUNTER
Patient is going to come in and do blood work. She has an appointment next week. She is asking if you can give her enough medication to last until her appt on 5/5.       Please approve or deny this request.    Rx requested:  Requested Prescriptions     Pending Prescriptions Disp Refills    lisinopril (PRINIVIL;ZESTRIL) 10 MG tablet 30 tablet 0     Sig: Take 1 tablet by mouth daily         Last Office Visit:   3/23/2021      Next Visit Date:  Future Appointments   Date Time Provider Arturo Macias   5/5/2022 11:15 AM Brittaney Roberts December, [address]

## 2022-04-29 NOTE — TELEPHONE ENCOUNTER
Patient's bw orders are . Can they please be re-ordered? She has an appointment next week. Thank you.

## 2022-05-05 ENCOUNTER — OFFICE VISIT (OUTPATIENT)
Dept: FAMILY MEDICINE CLINIC | Age: 62
End: 2022-05-05
Payer: COMMERCIAL

## 2022-05-05 VITALS
HEART RATE: 75 BPM | HEIGHT: 63 IN | WEIGHT: 293 LBS | BODY MASS INDEX: 51.91 KG/M2 | SYSTOLIC BLOOD PRESSURE: 136 MMHG | TEMPERATURE: 97.1 F | DIASTOLIC BLOOD PRESSURE: 84 MMHG | OXYGEN SATURATION: 98 %

## 2022-05-05 DIAGNOSIS — I10 PRIMARY HYPERTENSION: ICD-10-CM

## 2022-05-05 DIAGNOSIS — K21.9 GASTROESOPHAGEAL REFLUX DISEASE WITHOUT ESOPHAGITIS: ICD-10-CM

## 2022-05-05 DIAGNOSIS — Z12.11 COLON CANCER SCREENING: ICD-10-CM

## 2022-05-05 DIAGNOSIS — S39.011A STRAIN OF ABDOMINAL MUSCLE, INITIAL ENCOUNTER: Primary | ICD-10-CM

## 2022-05-05 PROCEDURE — 99214 OFFICE O/P EST MOD 30 MIN: CPT | Performed by: NURSE PRACTITIONER

## 2022-05-05 RX ORDER — PANTOPRAZOLE SODIUM 40 MG/1
40 TABLET, DELAYED RELEASE ORAL
Qty: 30 TABLET | Refills: 0 | Status: SHIPPED | OUTPATIENT
Start: 2022-05-05 | End: 2022-05-31 | Stop reason: SDUPTHER

## 2022-05-05 SDOH — ECONOMIC STABILITY: TRANSPORTATION INSECURITY
IN THE PAST 12 MONTHS, HAS THE LACK OF TRANSPORTATION KEPT YOU FROM MEDICAL APPOINTMENTS OR FROM GETTING MEDICATIONS?: NO

## 2022-05-05 SDOH — ECONOMIC STABILITY: FOOD INSECURITY: WITHIN THE PAST 12 MONTHS, THE FOOD YOU BOUGHT JUST DIDN'T LAST AND YOU DIDN'T HAVE MONEY TO GET MORE.: NEVER TRUE

## 2022-05-05 SDOH — ECONOMIC STABILITY: FOOD INSECURITY: WITHIN THE PAST 12 MONTHS, YOU WORRIED THAT YOUR FOOD WOULD RUN OUT BEFORE YOU GOT MONEY TO BUY MORE.: NEVER TRUE

## 2022-05-05 SDOH — ECONOMIC STABILITY: TRANSPORTATION INSECURITY
IN THE PAST 12 MONTHS, HAS LACK OF TRANSPORTATION KEPT YOU FROM MEETINGS, WORK, OR FROM GETTING THINGS NEEDED FOR DAILY LIVING?: NO

## 2022-05-05 ASSESSMENT — ENCOUNTER SYMPTOMS
HEMATOCHEZIA: 0
ORTHOPNEA: 0
BELCHING: 0
BLURRED VISION: 0
FLATUS: 0
NAUSEA: 0
ABDOMINAL PAIN: 1
VOMITING: 0
DIARRHEA: 0
SHORTNESS OF BREATH: 0
CONSTIPATION: 0

## 2022-05-05 ASSESSMENT — PATIENT HEALTH QUESTIONNAIRE - PHQ9
SUM OF ALL RESPONSES TO PHQ QUESTIONS 1-9: 0
2. FEELING DOWN, DEPRESSED OR HOPELESS: 0
SUM OF ALL RESPONSES TO PHQ9 QUESTIONS 1 & 2: 0
SUM OF ALL RESPONSES TO PHQ QUESTIONS 1-9: 0
SUM OF ALL RESPONSES TO PHQ QUESTIONS 1-9: 0
1. LITTLE INTEREST OR PLEASURE IN DOING THINGS: 0
SUM OF ALL RESPONSES TO PHQ QUESTIONS 1-9: 0

## 2022-05-05 ASSESSMENT — SOCIAL DETERMINANTS OF HEALTH (SDOH): HOW HARD IS IT FOR YOU TO PAY FOR THE VERY BASICS LIKE FOOD, HOUSING, MEDICAL CARE, AND HEATING?: NOT HARD AT ALL

## 2022-05-05 NOTE — PROGRESS NOTES
Subjective:      Patient ID: Darwin Patino is a 64 y.o. female who presents today for:     Chief Complaint   Patient presents with    Hypertension     Patient presents today to follow up on hypertension.  Abdominal Pain     Patient c/o upper abdominal pain. Patient states 8 months ago she lifted her granddaughter and had a cramp now it happens when she moves the wrong way. Hypertension  This is a chronic problem. The current episode started more than 1 year ago. The problem is unchanged. The problem is controlled. Pertinent negatives include no anxiety, blurred vision, chest pain, headaches, malaise/fatigue, neck pain, orthopnea, palpitations, peripheral edema, PND, shortness of breath or sweats. Risk factors for coronary artery disease include obesity. Past treatments include ACE inhibitors. The current treatment provides significant improvement. There are no compliance problems. Abdominal Pain  This is a recurrent problem. The current episode started more than 1 month ago. The problem occurs intermittently. The quality of the pain is cramping and sharp. Pertinent negatives include no arthralgias, belching, constipation, diarrhea, dysuria, fever, flatus, frequency, headaches, hematochezia, melena, myalgias, nausea, vomiting or weight loss. She has tried nothing for the symptoms.          Past Medical History:   Diagnosis Date    Hypertension     Obesity      Past Surgical History:   Procedure Laterality Date    CARPAL TUNNEL RELEASE      TONSILLECTOMY       Family History   Problem Relation Age of Onset    Heart Attack Paternal [de-identified]     Stroke Maternal Grandmother     Cancer Maternal Grandmother     Heart Disease Father     Hypertension Father     Diabetes Mother     Heart Disease Mother     Liver Cancer Brother     Diabetes Sister     Heart Defect Sister      Social History     Socioeconomic History    Marital status:      Spouse name: Not on file    Number of children: Not on file    Years of education: Not on file    Highest education level: Not on file   Occupational History    Not on file   Tobacco Use    Smoking status: Former Smoker     Packs/day: 1.00     Years: 7.00     Pack years: 7.00     Types: Cigarettes     Quit date: 1984     Years since quittin.3    Smokeless tobacco: Never Used   Vaping Use    Vaping Use: Never used   Substance and Sexual Activity    Alcohol use: Not Currently    Drug use: Never    Sexual activity: Yes     Partners: Male   Other Topics Concern    Not on file   Social History Narrative    Not on file     Social Determinants of Health     Financial Resource Strain: Low Risk     Difficulty of Paying Living Expenses: Not hard at all   Food Insecurity: No Food Insecurity    Worried About [address] [place] [address] [employer] in the Last Year: Never true    Naldo of Food in the Last Year: Never true   Transportation Needs: No Transportation Needs    Lack of Transportation (Medical): No    Lack of Transportation (Non-Medical):  No   Physical Activity:     Days of Exercise per Week: Not on file    Minutes of Exercise per Session: Not on file   Stress:     Feeling of Stress : Not on file   Social Connections:     Frequency of Communication with Friends and Family: Not on file    Frequency of Social Gatherings with Friends and Family: Not on file    Attends Jewish Services: Not on file    Active Member of [address] [place] [address] or Organizations: Not on file    Attends Club or Organization Meetings: Not on file    Marital Status: Not on file   Intimate Partner Violence:     Fear of Current or Ex-Partner: Not on file    Emotionally Abused: Not on file    Physically Abused: Not on file    Sexually Abused: Not on file   Housing Stability:     Unable to Pay for Housing in the Last Year: Not on file    Number of [place] in the Last Year: Not on file    Unstable Housing in the Last Year: Not on file     Current Outpatient Medications on File Prior to Visit   Medication Sig Dispense Refill    lisinopril (PRINIVIL;ZESTRIL) 10 MG tablet Take 1 tablet by mouth daily 30 tablet 0    acetaminophen (TYLENOL) 500 MG tablet Take 1,000 mg by mouth every 6 hours as needed for Pain      clobetasol (TEMOVATE) 0.05 % ointment Apply to arms & legs twice a day for up to 3 weeks, take 1 week off. Repeat for flare  2    Multiple Vitamins-Minerals (ONE-A-DAY WOMENS 50+ ADVANTAGE PO) Take by mouth       No current facility-administered medications on file prior to visit. Allergies:  Patient has no known allergies. Review of Systems   Constitutional: Negative for fever, malaise/fatigue and weight loss. Eyes: Negative for blurred vision. Respiratory: Negative for shortness of breath. Cardiovascular: Negative for chest pain, palpitations, orthopnea and PND. Gastrointestinal: Positive for abdominal pain. Negative for constipation, diarrhea, flatus, hematochezia, melena, nausea and vomiting. Genitourinary: Negative for dysuria and frequency. Musculoskeletal: Negative for arthralgias, myalgias and neck pain. Neurological: Negative for headaches. Objective:   /84 (Site: Left Lower Arm, Position: Sitting, Cuff Size: Medium Adult)   Pulse 75   Temp 97.1 °F (36.2 °C) (Temporal)   Ht 5' 3\" (1.6 m)   Wt (!) 300 lb 9.6 oz (136.4 kg)   LMP 01/01/2015 (Within Months)   SpO2 98%   BMI 53.25 kg/m²     Physical Exam  Constitutional:       Appearance: She is well-developed. HENT:      Head: Normocephalic. Right Ear: Tympanic membrane, ear canal and external ear normal.      Left Ear: Tympanic membrane, ear canal and external ear normal.      Nose: Nose normal.      Mouth/Throat:      Mouth: Mucous membranes are moist.      Pharynx: Oropharynx is clear. Uvula midline. Eyes:      General:         Right eye: No discharge. Left eye: No discharge.       Conjunctiva/sclera: Conjunctivae normal.   Cardiovascular:      Rate and Rhythm: Normal rate and regular rhythm. Heart sounds: Normal heart sounds. Pulmonary:      Effort: Pulmonary effort is normal. No respiratory distress. Breath sounds: Normal breath sounds. Abdominal:      General: Bowel sounds are normal.      Palpations: Abdomen is soft. Tenderness: There is no abdominal tenderness. There is no right CVA tenderness or left CVA tenderness. Musculoskeletal:      Cervical back: Normal range of motion. Lymphadenopathy:      Cervical: No cervical adenopathy. Skin:     General: Skin is warm and dry. Neurological:      Mental Status: She is alert and oriented to person, place, and time. Psychiatric:         Mood and Affect: Mood normal.         Behavior: Behavior normal.         Assessment:          Diagnosis Orders   1. Strain of abdominal muscle, initial encounter     2. Gastroesophageal reflux disease without esophagitis  pantoprazole (PROTONIX) 40 MG tablet   3. Primary hypertension     4. Colon cancer screening  Michael Edmond MD, [facility]       Plan:      Orders Placed This Encounter   Procedures   Mik[facility] Walter [facility]     Referral Priority:   Routine     Referral Type:   Eval and Treat     Referral Reason:   Specialty Services Required     Referred to Provider:   Flavia Soto MD     Requested Specialty:   Gastroenterology     Number of Visits Requested:   1          Orders Placed This Encounter   Medications    pantoprazole (PROTONIX) 40 MG tablet     Sig: Take 1 tablet by mouth every morning (before breakfast)     Dispense:  30 tablet     Refill:  0       Return in about 6 months (around 11/5/2022) for evaluation with PCP. 1. Gastroesophageal reflux disease without esophagitis    - pantoprazole (PROTONIX) 40 MG tablet; Take 1 tablet by mouth every morning (before breakfast)  Dispense: 30 tablet; Refill: 0    2. Strain of abdominal muscle, initial encounter  Encouraged core stretching and strengthening    3. Primary hypertension  Continue lisinopril 10mg daily  DASH diet    4. Colon cancer screening    - Vaishali Mccall MD, Gastroenterology, [place]      Reviewed with the patient: current clinicalstatus, medications, activities and diet. Side effects, adverse effects of the medication prescribedtoday, as well as treatment plan/ rationale and result expectations have been discussedwith the patient who expresses understanding and desires to proceed. Close follow upto evaluate treatment results and for coordination of care. I have reviewedthe patient's medical history in detail and updated the computerized patient record.     Annemarie Piña, PRAVEEN - CNP

## 2022-05-05 NOTE — PATIENT INSTRUCTIONS
Patient Education        Learning About the Glycemic Index  What is the glycemic index? The glycemic index (GI) is a rating system for foods that contain carbohydrate. It helps you know how much these foods raise blood sugar. Carbohydrate raises blood sugar more than other nutrients, like proteins andfats. Some carbohydrate foods raise blood sugar more than others.  Low-glycemic foods make blood sugar rise less.  High-glycemic foods make blood sugar rise more. How does it work? Foods in the index are ranked by number.  High glycemic index foods are rated 70 and above.  Medium glycemic index foods are 56 to 69.  Low glycemic index foods are 55 or less. What do you need to know? Some people who have diabetes use the glycemic index to help them plan mealsand manage blood sugar.  If you have diabetes, talk with your doctor, a dietitian, or a certified diabetes educator before using a low glycemic index eating plan.  Eating low glycemic foods is most helpful when used along with another eating plan for diabetes, such as carbohydrate counting. Counting carbs helps you know how much carbohydrate you're eating. The amount of carbohydrate you eat is more important than the glycemic index of foods in helping you control your blood sugar.  The rating of a food can change depending on ripeness, how it is prepared (juiced, mashed, ground), how it is cooked, and how long it is stored.  People respond differently to the glycemic content of foods. Many things affect the glycemic index. The only way to know for sure how a food affects your blood sugar is to check your blood sugar before and after you eat that food.  High GI foods are rarely eaten on their own. This means that the glycemic index might not be helpful unless you're eating a food by itself. Eating foods together can change their rating. What are examples of foods in the glycemic index?    High glycemic index foods include:  ? Watermelon. ? Baked potatoes, such as a russet. ? Instant oatmeal.  ? White bread. ? Whole wheat bread.  Medium glycemic index foods include:  ? Hamburger buns (white). ? Brown rice. ? Pumpkin. ? Sweet potatoes, boiled.  Low glycemic index foods include:  ? Apples. ? Whole wheat spaghetti. ? Dried beans and lentils. Where can you learn more? Go to https://Evoke PharmapepicewLocality.Digabit. org and sign in to your [employer] account. Enter J877 in the [facility] box to learn more about \"Learning About the Glycemic Index. \"     If you do not have an account, please click on the \"Sign Up Now\" link. Current as of: July 28, 2021               Content Version: 13.2  © 2006-2022 [employer]. Care instructions adapted under license by [facility] ([facility]). If you have questions about a medical condition or this instruction, always ask your healthcare professional. [place] [address] any warranty or liability for your use of this information. Patient Education        Abdominal Strain: Rehab Exercises  Introduction  Here are some examples of exercises for you to try. The exercises may be suggested for a condition or for rehabilitation. Start each exercise slowly. Ease off the exercises if you start to have pain. You will be told when to start these exercises and which ones will work bestfor you. How to do the exercises  Tummy tuck    1. Lie on your back with your knees bent. Place two fingers just inside your hip bones so you can feel your lower belly muscles. 2. Take a deep breath in.  3. As you breathe out, pull your belly button in toward your spine, as if you are trying to zip up a tight pair of jeans. You should feel your lower belly muscles pull slightly away from your fingers as the muscles tighten. 4. Hold for about 6 seconds, but do not hold your breath. 5. Relax up to 10 seconds. 6. Repeat 8 to 12 times.   7. Repeat several times a day, and try to hold your lower belly muscles in for longer as you get stronger. 8. Practice doing this exercise while you are standing, such as when you are standing in line, or sitting. Pelvic tilt    1. Lie on your back with your knees bent. 2. \"Brace\" your stomachtighten your muscles by pulling in and imagining your belly button moving toward your spine. 3. Press your lower back into the floor. You should feel your hips and pelvis rock back. 4. Hold for 6 seconds while breathing smoothly. 5. Relax and allow your pelvis and hips to rock forward. 6. Repeat 8 to 12 times. Curl-up    1. Lie down with your knees bent and your arms at your sides. Keep your feet flat on the floor. 2. Lift your head and shoulders up a few inches. At the same time, raise your arms to about thigh level. 3. Hold for 6 seconds. 4. Relax and return to your starting position. 5. Repeat 8 to 12 times. Diagonal curl-up    1. Lie down with your knees bent and your arms at your sides. Keep your feet flat on the floor. 2. Lift your head and shoulders up. At the same time, reach to one side with both arms. 3. Hold for 6 seconds. 4. Relax and return to your starting position. 5. Repeat 8 to 12 times. 6. Repeat the same steps on your other side. Follow-up care is a key part of your treatment and safety. Be sure to make and go to all appointments, and call your doctor if you are having problems. It's also a good idea to know your test results and keep alist of the medicines you take. Where can you learn more? Go to https://Triage.[employer]ule[employer]. org and sign in to your [employer] account. Enter Y844 in the [facility] box to learn more about \"Abdominal Strain: Rehab Exercises. \"     If you do not have an account, please click on the \"Sign Up Now\" link. Current as of: July 1, 2021               Content Version: 13.2  © 4935-7449 Healthwise, Inc[employer]. Care instructions adapted under license by [facility] ([facility]).  If you have questions about a medical condition or this instruction, always ask your healthcare professional. [place] [address] any warranty or liability for your use of this information.

## 2022-05-17 DIAGNOSIS — I10 HYPERTENSION, UNSPECIFIED TYPE: ICD-10-CM

## 2022-05-17 DIAGNOSIS — E78.00 PURE HYPERCHOLESTEROLEMIA: ICD-10-CM

## 2022-05-17 LAB
ALBUMIN SERPL-MCNC: 4.1 G/DL (ref 3.5–4.6)
ALP BLD-CCNC: 84 U/L (ref 40–130)
ALT SERPL-CCNC: 23 U/L (ref 0–33)
ANION GAP SERPL CALCULATED.3IONS-SCNC: 14 MEQ/L (ref 9–15)
AST SERPL-CCNC: 29 U/L (ref 0–35)
BASOPHILS ABSOLUTE: 0 K/UL (ref 0–0.2)
BASOPHILS RELATIVE PERCENT: 0.6 %
BILIRUB SERPL-MCNC: <0.2 MG/DL (ref 0.2–0.7)
BUN BLDV-MCNC: 15 MG/DL (ref 8–23)
CALCIUM SERPL-MCNC: 9.2 MG/DL (ref 8.5–9.9)
CHLORIDE BLD-SCNC: 104 MEQ/L (ref 95–107)
CHOLESTEROL, TOTAL: 191 MG/DL (ref 0–199)
CO2: 23 MEQ/L (ref 20–31)
CREAT SERPL-MCNC: 0.65 MG/DL (ref 0.5–0.9)
EOSINOPHILS ABSOLUTE: 0.5 K/UL (ref 0–0.7)
EOSINOPHILS RELATIVE PERCENT: 6.7 %
GFR AFRICAN AMERICAN: >60
GFR NON-AFRICAN AMERICAN: >60
GLOBULIN: 3.1 G/DL (ref 2.3–3.5)
GLUCOSE BLD-MCNC: 96 MG/DL (ref 70–99)
HCT VFR BLD CALC: 42.2 % (ref 37–47)
HDLC SERPL-MCNC: 46 MG/DL (ref 40–59)
HEMOGLOBIN: 13.7 G/DL (ref 12–16)
LDL CHOLESTEROL CALCULATED: 125 MG/DL (ref 0–129)
LYMPHOCYTES ABSOLUTE: 2.8 K/UL (ref 1–4.8)
LYMPHOCYTES RELATIVE PERCENT: 35.8 %
MCH RBC QN AUTO: 29.7 PG (ref 27–31.3)
MCHC RBC AUTO-ENTMCNC: 32.5 % (ref 33–37)
MCV RBC AUTO: 91.5 FL (ref 82–100)
MONOCYTES ABSOLUTE: 0.6 K/UL (ref 0.2–0.8)
MONOCYTES RELATIVE PERCENT: 7.3 %
NEUTROPHILS ABSOLUTE: 3.8 K/UL (ref 1.4–6.5)
NEUTROPHILS RELATIVE PERCENT: 49.6 %
PDW BLD-RTO: 14.9 % (ref 11.5–14.5)
PLATELET # BLD: 217 K/UL (ref 130–400)
POTASSIUM SERPL-SCNC: 4.5 MEQ/L (ref 3.4–4.9)
RBC # BLD: 4.61 M/UL (ref 4.2–5.4)
SODIUM BLD-SCNC: 141 MEQ/L (ref 135–144)
TOTAL PROTEIN: 7.2 G/DL (ref 6.3–8)
TRIGL SERPL-MCNC: 102 MG/DL (ref 0–150)
WBC # BLD: 7.8 K/UL (ref 4.8–10.8)

## 2022-05-31 ENCOUNTER — OFFICE VISIT (OUTPATIENT)
Dept: FAMILY MEDICINE CLINIC | Age: 62
End: 2022-05-31
Payer: COMMERCIAL

## 2022-05-31 VITALS
TEMPERATURE: 97.5 F | RESPIRATION RATE: 16 BRPM | BODY MASS INDEX: 51.91 KG/M2 | OXYGEN SATURATION: 98 % | HEART RATE: 82 BPM | DIASTOLIC BLOOD PRESSURE: 80 MMHG | SYSTOLIC BLOOD PRESSURE: 130 MMHG | HEIGHT: 63 IN | WEIGHT: 293 LBS

## 2022-05-31 DIAGNOSIS — K21.9 GASTROESOPHAGEAL REFLUX DISEASE WITHOUT ESOPHAGITIS: ICD-10-CM

## 2022-05-31 DIAGNOSIS — I10 HYPERTENSION, UNSPECIFIED TYPE: Primary | ICD-10-CM

## 2022-05-31 DIAGNOSIS — Z12.31 ENCOUNTER FOR SCREENING MAMMOGRAM FOR MALIGNANT NEOPLASM OF BREAST: ICD-10-CM

## 2022-05-31 PROCEDURE — 99213 OFFICE O/P EST LOW 20 MIN: CPT | Performed by: FAMILY MEDICINE

## 2022-05-31 RX ORDER — LISINOPRIL 10 MG/1
10 TABLET ORAL DAILY
Qty: 90 TABLET | Refills: 1 | Status: SHIPPED | OUTPATIENT
Start: 2022-05-31

## 2022-05-31 RX ORDER — PANTOPRAZOLE SODIUM 40 MG/1
40 TABLET, DELAYED RELEASE ORAL
Qty: 90 TABLET | Refills: 0 | Status: SHIPPED | OUTPATIENT
Start: 2022-05-31 | End: 2022-09-01

## 2022-05-31 ASSESSMENT — ENCOUNTER SYMPTOMS
SHORTNESS OF BREATH: 0
COUGH: 0

## 2022-05-31 NOTE — PROGRESS NOTES
Subjective:      Patient ID: Janina Markham is a 64 y.o. female. HPI  Here in follow up for htn and lipids and blood work. Does need to take pepcid when not taking protonix-seems worse recently   Review of Systems   Constitutional: Negative for chills and fever. Respiratory: Negative for cough and shortness of breath. Cardiovascular: Negative for chest pain. Skin: Negative for rash. Neurological: Negative for weakness. Treatment Adherence:   Medication compliance:  compliant most of the time  Diet compliance:  compliant most of the time  Weight trend: increasing  Current exercise: no regular exercise  Barriers: none    Hypertension:  Home blood pressure monitoring: no .  She is adherent to a low sodium diet. Patient denies chest pain. Antihypertensive medication side effects: no medication side effects noted. Use of agents associated with hypertension: none. Sodium (mEq/L)   Date Value   05/17/2022 141    BUN (mg/dL)   Date Value   05/17/2022 15    Glucose (mg/dL)   Date Value   05/17/2022 96      Potassium (mEq/L)   Date Value   05/17/2022 4.5    CREATININE (mg/dL)   Date Value   05/17/2022 0.65         Hyperlipidemia:  No new myalgias or GI upset on none.      Lab Results   Component Value Date    CHOL 191 05/17/2022    TRIG 102 05/17/2022    HDL 46 05/17/2022    LDLCALC 125 05/17/2022     Lab Results   Component Value Date    ALT 23 05/17/2022    AST 29 05/17/2022        Reviewed allergy, medical, social, surgical, family and med list changes and updated   Files--reviewed blood work which is stable   Social History     Socioeconomic History    Marital status:      Spouse name: None    Number of children: None    Years of education: None    Highest education level: None   Occupational History    None   Tobacco Use    Smoking status: Former Smoker     Packs/day: 1.00     Years: 7.00     Pack years: 7.00     Types: Cigarettes     Quit date: 1984     Years since quittin.4    Smokeless tobacco: Never Used   Vaping Use    Vaping Use: Never used   Substance and Sexual Activity    Alcohol use: Not Currently    Drug use: Never    Sexual activity: Yes     Partners: Male   Other Topics Concern    None   Social History Narrative    None     Social Determinants of Health     Financial Resource Strain: Low Risk     Difficulty of Paying Living Expenses: Not hard at all   Food Insecurity: No Food Insecurity    Worried About Running Out of Food in the Last Year: Never true    Naldo of Food in the Last Year: Never true   Transportation Needs: No Transportation Needs    Lack of Transportation (Medical): No    Lack of Transportation (Non-Medical):  No   Physical Activity:     Days of Exercise per Week: Not on file    Minutes of Exercise per Session: Not on file   Stress:     Feeling of Stress : Not on file   Social Connections:     Frequency of Communication with Friends and Family: Not on file    Frequency of Social Gatherings with Friends and Family: Not on file    Attends Jainism Services: Not on file    Active Member of [address] [place] [address] or Organizations: Not on file    Attends Club or Organization Meetings: Not on file    Marital Status: Not on file   Intimate Partner Violence:     Fear of Current or Ex-Partner: Not on file    Emotionally Abused: Not on file    Physically Abused: Not on file    Sexually Abused: Not on file   Housing Stability:     Unable to Pay for Housing in the Last Year: Not on file    Number of [place] in the Last Year: Not on file    Unstable Housing in the Last Year: Not on file     Current Outpatient Medications   Medication Sig Dispense Refill    pantoprazole (PROTONIX) 40 MG tablet Take 1 tablet by mouth every morning (before breakfast) 30 tablet 0    lisinopril (PRINIVIL;ZESTRIL) 10 MG tablet Take 1 tablet by mouth daily 30 tablet 0    acetaminophen (TYLENOL) 500 MG tablet Take 1,000 mg by mouth every 6 hours as needed for Pain      clobetasol (TEMOVATE) 0.05 % ointment Apply to arms & legs twice a day for up to 3 weeks, take 1 week off. Repeat for flare  2    Multiple Vitamins-Minerals (ONE-A-DAY WOMENS 50+ ADVANTAGE PO) Take by mouth       No current facility-administered medications for this visit. Family History   Problem Relation Age of Onset    Heart Attack Paternal Grandmother     Stroke Maternal Grandmother     Cancer Maternal Grandmother     Heart Disease Father     Hypertension Father     Diabetes Mother     Heart Disease Mother     Liver Cancer Brother     Diabetes Sister     Heart Defect Sister      Past Medical History:   Diagnosis Date    Hypertension     Obesity      Objective:   Physical Exam  Neck:no carotid bruits. No masses. No adenopathy. No thyroid asymmetry. Lungs:clear and equal breath sounds. No wheezes or rales. Heart:rate reg. No murmur. No gallops   Pulses:Radials 2+ equal               Poster tib 1+ equal  Extremities:no edema in either leg  Gen: In no acute distress  Abdomen; B.S present. Soft  Non tender. No hepatosplenomegaly.  No masses   Assessment / Plan:     Orders Placed This Encounter   Medications    lisinopril (PRINIVIL;ZESTRIL) 10 MG tablet     Sig: Take 1 tablet by mouth daily     Dispense:  90 tablet     Refill:  1    pantoprazole (PROTONIX) 40 MG tablet     Sig: Take 1 tablet by mouth every morning (before breakfast)     Dispense:  90 tablet     Refill:  0     Orders Placed This Encounter   Procedures    ROB FRANCIA DIGITAL SCREEN BILATERAL     Standing Status:   Future     Standing Expiration Date:   7/31/2023   Melissa Joy MD, Gastroenterology, [place]     Referral Priority:   Routine     Referral Type:   Eval and Treat     Referral Reason:   Specialty Services Required     Referred to Provider:   Katia Bullard MD     Requested Specialty:   Gastroenterology     Number of Visits Requested:   1      F/u in 6 months

## 2022-06-14 ENCOUNTER — OFFICE VISIT (OUTPATIENT)
Dept: GASTROENTEROLOGY | Age: 62
End: 2022-06-14
Payer: COMMERCIAL

## 2022-06-14 VITALS — HEIGHT: 65 IN | BODY MASS INDEX: 48.82 KG/M2 | HEART RATE: 73 BPM | OXYGEN SATURATION: 100 % | WEIGHT: 293 LBS

## 2022-06-14 DIAGNOSIS — Z87.19 HISTORY OF HEMORRHOIDS: ICD-10-CM

## 2022-06-14 DIAGNOSIS — K21.9 GASTROESOPHAGEAL REFLUX DISEASE, UNSPECIFIED WHETHER ESOPHAGITIS PRESENT: Primary | ICD-10-CM

## 2022-06-14 PROCEDURE — 99203 OFFICE O/P NEW LOW 30 MIN: CPT | Performed by: INTERNAL MEDICINE

## 2022-06-14 NOTE — PROGRESS NOTES
Gastroenterology Clinic Visit    Gillian Wong  [id number]  Chief Complaint   Patient presents with    New Patient    Gastroesophageal Reflux     HPI: 64 y.o. female presents to the clinic with gastroesophageal reflux symptoms. Patient reports symptoms for the last year. Was initially treated with 1 month of Protonix with good symptom control. Patient has been using over-the-counter Prilosec in between until recently when she was given another prescription for Protonix. Patient reports good symptom relief of reflux with Protonix. She denies any hematemesis, dysphagia or weight loss. Patient does have significant central and generalized obesity, did lose intentionally 60 pounds but gained it all back due to dietary indiscretions. She is scheduled for a colonoscopy on 6/20/2022  Patient reports history of hemorrhoids in the remote past, at this time bowel movements are regular. She has daily bowel movements without constipation or straining. Previous GI work up/Endoscopic investigations:   None    Review of Systems   All other systems reviewed and are negative. Past Medical History:   Diagnosis Date    Hypertension     Obesity      Past Surgical History:   Procedure Laterality Date    CARPAL TUNNEL RELEASE      TONSILLECTOMY       Current Outpatient Medications on File Prior to Visit   Medication Sig Dispense Refill    lisinopril (PRINIVIL;ZESTRIL) 10 MG tablet Take 1 tablet by mouth daily 90 tablet 1    pantoprazole (PROTONIX) 40 MG tablet Take 1 tablet by mouth every morning (before breakfast) 90 tablet 0    acetaminophen (TYLENOL) 500 MG tablet Take 1,000 mg by mouth every 6 hours as needed for Pain      clobetasol (TEMOVATE) 0.05 % ointment Apply to arms & legs twice a day for up to 3 weeks, take 1 week off. Repeat for flare  2    Multiple Vitamins-Minerals (ONE-A-DAY WOMENS 50+ ADVANTAGE PO) Take by mouth       No current facility-administered medications on file prior to visit.      Family History   Problem Relation Age of Onset    Heart Attack Paternal Grandmother     Stroke Maternal Grandmother     Cancer Maternal Grandmother     Heart Disease Father     Hypertension Father     Diabetes Mother     Heart Disease Mother     Liver Cancer Brother     Diabetes Sister     Heart Defect Sister     Colon Cancer Neg Hx      Social History     Socioeconomic History    Marital status:      Spouse name: None    Number of children: None    Years of education: None    Highest education level: None   Occupational History    None   Tobacco Use    Smoking status: Former Smoker     Packs/day: 1.00     Years: 7.00     Pack years: 7.00     Types: Cigarettes     Quit date: 1984     Years since quittin.4    Smokeless tobacco: Never Used   Vaping Use    Vaping Use: Never used   Substance and Sexual Activity    Alcohol use: Not Currently    Drug use: Never    Sexual activity: Yes     Partners: Male   Other Topics Concern    None   Social History Narrative    None     Social Determinants of Health     Financial Resource Strain: Low Risk     Difficulty of Paying Living Expenses: Not hard at all   Food Insecurity: No Food Insecurity    Worried About Running Out of Food in the Last Year: Never true    Naldo of Food in the Last Year: Never true   Transportation Needs: No Transportation Needs    Lack of Transportation (Medical): No    Lack of Transportation (Non-Medical):  No   Physical Activity:     Days of Exercise per Week: Not on file    Minutes of Exercise per Session: Not on file   Stress:     Feeling of Stress : Not on file   Social Connections:     Frequency of Communication with Friends and Family: Not on file    Frequency of Social Gatherings with Friends and Family: Not on file    Attends Judaism Services: Not on file    Active Member of Clubs or Organizations: Not on file    Attends Club or Organization Meetings: Not on file    Marital Status: Not on file Intimate Partner Violence:     Fear of Current or Ex-Partner: Not on file    Emotionally Abused: Not on file    Physically Abused: Not on file    Sexually Abused: Not on file   Housing Stability:     Unable to Pay for Housing in the Last Year: Not on file    Number of [place] in the Last Year: Not on file    Unstable Housing in the Last Year: Not on file       Pulse 73, height 5' 5\" (1.651 m), weight (!) 304 lb (137.9 kg), last menstrual period 01/01/2015, SpO2 100 %, not currently breastfeeding. Physical Exam  Constitutional:       General: She is not in acute distress. Appearance: Normal appearance. She is well-developed. Comments: Central obesity and generalized obesity   Eyes:      General: No scleral icterus. Cardiovascular:      Rate and Rhythm: Normal rate and regular rhythm. Pulmonary:      Effort: Pulmonary effort is normal.      Breath sounds: Normal breath sounds. Abdominal:      General: Bowel sounds are normal. There is no distension. Palpations: Abdomen is soft. There is no mass. Tenderness: There is no abdominal tenderness. There is no guarding or rebound. Musculoskeletal:         General: Normal range of motion. Lymphadenopathy:      Cervical: No cervical adenopathy. Neurological:      Mental Status: She is alert and oriented to person, place, and time. Psychiatric:         Behavior: Behavior normal.         Thought Content:  Thought content normal.         Judgment: Judgment normal.       Laboratory, Pathology, Radiology reviewed indetail with relevant important investigations summarized below:  Lab Results   Component Value Date    WBC 7.8 05/17/2022    HGB 13.7 05/17/2022    HCT 42.2 05/17/2022    MCV 91.5 05/17/2022     05/17/2022     No results found for: IRON, TIBC, FERRITIN  No results found for: Damon Legato   No results found for: FOLATE  Lab Results   Component Value Date    LABALBU 4.1 05/17/2022      Lab Results   Component Value Date ALT 23 05/17/2022    AST 29 05/17/2022    ALKPHOS 84 05/17/2022    BILITOT <0.2 05/17/2022     Assessment and Plan:  64 y.o. female with   1. Gastroesophageal reflux disease, unspecified whether esophagitis present  - Add EGD to already scheduled colonoscopy on 6/20/2022  -Detailed discussion regarding antireflux measures including importance of weight loss, elevation of head of the bed, avoid eating 3 to 4 hours prior to going to bed.  -Continue with Protonix at current dose    2. History of hemorrhoids  -Avoid constipation, use Anusol/Preparation H for hemorrhoids if symptomatic  -Fiber supplementation, adequate fluid intake on a daily basis    Follow-up to be scheduled based on endoscopic evaluation and findings       Wander Samuels MD   Staff Gastroenterologist  [facility]    Please note this report has been partially produced using speech recognition software and may cause or contain errors related to thatsystem including grammar, punctuation and spelling as well as words and phrases that may seem inappropriate. If there are questions or concerns please feel free to contact me to clarify.

## 2022-07-22 ENCOUNTER — ANESTHESIA EVENT (OUTPATIENT)
Dept: ENDOSCOPY | Age: 62
End: 2022-07-22
Payer: COMMERCIAL

## 2022-07-22 ENCOUNTER — ANESTHESIA (OUTPATIENT)
Dept: ENDOSCOPY | Age: 62
End: 2022-07-22
Payer: COMMERCIAL

## 2022-07-22 ENCOUNTER — HOSPITAL ENCOUNTER (OUTPATIENT)
Age: 62
Setting detail: OUTPATIENT SURGERY
Discharge: HOME | End: 2022-07-22
Attending: INTERNAL MEDICINE | Admitting: INTERNAL MEDICINE
Payer: COMMERCIAL

## 2022-07-22 VITALS
TEMPERATURE: 97.1 F | OXYGEN SATURATION: 99 % | RESPIRATION RATE: 16 BRPM | BODY MASS INDEX: 51.91 KG/M2 | HEART RATE: 79 BPM | SYSTOLIC BLOOD PRESSURE: 144 MMHG | DIASTOLIC BLOOD PRESSURE: 64 MMHG | HEIGHT: 63 IN | WEIGHT: 293 LBS

## 2022-07-22 DIAGNOSIS — K21.9 GASTROESOPHAGEAL REFLUX DISEASE, UNSPECIFIED WHETHER ESOPHAGITIS PRESENT: ICD-10-CM

## 2022-07-22 DIAGNOSIS — Z12.11 COLON CANCER SCREENING: ICD-10-CM

## 2022-07-22 PROCEDURE — 43235 EGD DIAGNOSTIC BRUSH WASH: CPT | Performed by: INTERNAL MEDICINE

## 2022-07-22 PROCEDURE — 6370000000 HC RX 637 (ALT 250 FOR IP): Performed by: INTERNAL MEDICINE

## 2022-07-22 PROCEDURE — 3609027000 HC COLONOSCOPY: Performed by: INTERNAL MEDICINE

## 2022-07-22 PROCEDURE — 3700000000 HC ANESTHESIA ATTENDED CARE: Performed by: INTERNAL MEDICINE

## 2022-07-22 PROCEDURE — 7100000010 HC PHASE II RECOVERY - FIRST 15 MIN: Performed by: INTERNAL MEDICINE

## 2022-07-22 PROCEDURE — 2580000003 HC RX 258: Performed by: INTERNAL MEDICINE

## 2022-07-22 PROCEDURE — 2709999900 HC NON-CHARGEABLE SUPPLY: Performed by: INTERNAL MEDICINE

## 2022-07-22 PROCEDURE — 3609017100 HC EGD: Performed by: INTERNAL MEDICINE

## 2022-07-22 PROCEDURE — 2580000003 HC RX 258

## 2022-07-22 PROCEDURE — 45380 COLONOSCOPY AND BIOPSY: CPT | Performed by: INTERNAL MEDICINE

## 2022-07-22 PROCEDURE — 2500000003 HC RX 250 WO HCPCS: Performed by: NURSE ANESTHETIST, CERTIFIED REGISTERED

## 2022-07-22 PROCEDURE — 3700000001 HC ADD 15 MINUTES (ANESTHESIA): Performed by: INTERNAL MEDICINE

## 2022-07-22 PROCEDURE — 45385 COLONOSCOPY W/LESION REMOVAL: CPT | Performed by: INTERNAL MEDICINE

## 2022-07-22 PROCEDURE — 6360000002 HC RX W HCPCS: Performed by: NURSE ANESTHETIST, CERTIFIED REGISTERED

## 2022-07-22 PROCEDURE — 7100000011 HC PHASE II RECOVERY - ADDTL 15 MIN: Performed by: INTERNAL MEDICINE

## 2022-07-22 PROCEDURE — 88305 TISSUE EXAM BY PATHOLOGIST: CPT

## 2022-07-22 RX ORDER — SODIUM CHLORIDE 9 MG/ML
INJECTION, SOLUTION INTRAVENOUS CONTINUOUS
Status: DISCONTINUED | OUTPATIENT
Start: 2022-07-22 | End: 2022-07-22 | Stop reason: HOSPADM

## 2022-07-22 RX ORDER — MAGNESIUM HYDROXIDE 1200 MG/15ML
LIQUID ORAL PRN
Status: DISCONTINUED | OUTPATIENT
Start: 2022-07-22 | End: 2022-07-22 | Stop reason: ALTCHOICE

## 2022-07-22 RX ORDER — SIMETHICONE 20 MG/.3ML
EMULSION ORAL PRN
Status: DISCONTINUED | OUTPATIENT
Start: 2022-07-22 | End: 2022-07-22 | Stop reason: ALTCHOICE

## 2022-07-22 RX ORDER — LIDOCAINE HYDROCHLORIDE 20 MG/ML
INJECTION, SOLUTION INFILTRATION; PERINEURAL PRN
Status: DISCONTINUED | OUTPATIENT
Start: 2022-07-22 | End: 2022-07-22 | Stop reason: SDUPTHER

## 2022-07-22 RX ORDER — SODIUM CHLORIDE 9 MG/ML
INJECTION, SOLUTION INTRAVENOUS
Status: COMPLETED
Start: 2022-07-22 | End: 2022-07-22

## 2022-07-22 RX ORDER — PROPOFOL 10 MG/ML
INJECTION, EMULSION INTRAVENOUS PRN
Status: DISCONTINUED | OUTPATIENT
Start: 2022-07-22 | End: 2022-07-22 | Stop reason: SDUPTHER

## 2022-07-22 RX ADMIN — PROPOFOL 420 MG: 10 INJECTION, EMULSION INTRAVENOUS at 10:50

## 2022-07-22 RX ADMIN — LIDOCAINE HYDROCHLORIDE 60 MG: 20 INJECTION, SOLUTION INFILTRATION; PERINEURAL at 10:50

## 2022-07-22 RX ADMIN — SODIUM CHLORIDE 1000 ML: 9 INJECTION, SOLUTION INTRAVENOUS at 10:24

## 2022-07-22 ASSESSMENT — PAIN - FUNCTIONAL ASSESSMENT: PAIN_FUNCTIONAL_ASSESSMENT: 0-10

## 2022-07-22 ASSESSMENT — LIFESTYLE VARIABLES: SMOKING_STATUS: 1

## 2022-07-22 NOTE — ANESTHESIA POSTPROCEDURE EVALUATION
Department of Anesthesiology  Postprocedure Note    Patient: Roberto Ramey  MRN: [id number]  YOB: 1960  Date of evaluation: 7/22/2022      Procedure Summary     Date: 07/22/22 Room / Location: [address] [place] [address]    Anesthesia Start: 1042 Anesthesia Stop:     Procedures:       COLORECTAL CANCER SCREENING, NOT HIGH RISK      EGD DIAGNOSTIC ONLY Diagnosis:       Colon cancer screening      Gastroesophageal reflux disease, unspecified whether esophagitis present      (Screening, Prabhjot Koyanagi)    Surgeons: Jennifer Hagen MD Responsible Provider: PRAVEEN Toussaint CRNA    Anesthesia Type: MAC ASA Status: 3          Anesthesia Type: No value filed.     Mike Phase I: Mike Score: 10    Mike Phase II:        Anesthesia Post Evaluation    Patient location during evaluation: PACU  Patient participation: complete - patient participated  Level of consciousness: awake and alert  Pain score: 1  Airway patency: patent  Nausea & Vomiting: no nausea and no vomiting  Complications: no  Cardiovascular status: hemodynamically stable  Respiratory status: acceptable and room air  Hydration status: euvolemic  Comments: Report to RN, normal sinus rhythm

## 2022-07-22 NOTE — ANESTHESIA PRE PROCEDURE
Department of Anesthesiology  Preprocedure Note       Name:  Shruthi Schmitt   Age:  58 y.o.  :  1960                                          MRN:  [id number]         Date:  2022      Surgeon: Venita Reece):  Dior Balbuena MD    Procedure: Procedure(s):  COLORECTAL CANCER SCREENING, NOT HIGH RISK  EGD DIAGNOSTIC ONLY    Medications prior to admission:   Prior to Admission medications    Medication Sig Start Date End Date Taking? Authorizing Provider   lisinopril (PRINIVIL;ZESTRIL) 10 MG tablet Take 1 tablet by mouth daily 22   Janann Boast, MD   pantoprazole (PROTONIX) 40 MG tablet Take 1 tablet by mouth every morning (before breakfast) 22   Janann Boast, MD   acetaminophen (TYLENOL) 500 MG tablet Take 1,000 mg by mouth every 6 hours as needed for Pain    Historical Provider, MD   clobetasol (TEMOVATE) 0.05 % ointment Apply to arms & legs twice a day for up to 3 weeks, take 1 week off. Repeat for flare  Patient not taking: Reported on 2022   Historical Provider, MD   Multiple Vitamins-Minerals (ONE-A-DAY WOMENS 50+ ADVANTAGE PO) Take by mouth    Historical Provider, MD       Current medications:    Current Facility-Administered Medications   Medication Dose Route Frequency Provider Last Rate Last Admin    0.9 % sodium chloride infusion   IntraVENous Continuous Dior Balbuena MD        sodium chloride 0.9 % infusion                Allergies:  No Known Allergies    Problem List:  There is no problem list on file for this patient.       Past Medical History:        Diagnosis Date    Hypertension     Obesity        Past Surgical History:        Procedure Laterality Date    CARPAL TUNNEL RELEASE      TONSILLECTOMY         Social History:    Social History     Tobacco Use    Smoking status: Former     Packs/day: 1.00     Years: 7.00     Pack years: 7.00     Types: Cigarettes     Quit date: 1984     Years since quittin.5    Smokeless tobacco: Never   Substance Use Topics  Alcohol use: Not Currently     Comment: very occationally                                Counseling given: Not Answered      Vital Signs (Current): There were no vitals filed for this visit. BP Readings from Last 3 Encounters:   05/31/22 130/80   05/05/22 136/84   03/23/21 120/84       NPO Status:                                                                                 BMI:   Wt Readings from Last 3 Encounters:   06/14/22 (!) 304 lb (137.9 kg)   05/31/22 (!) 300 lb 12.8 oz (136.4 kg)   05/05/22 (!) 300 lb 9.6 oz (136.4 kg)     There is no height or weight on file to calculate BMI.    CBC:   Lab Results   Component Value Date/Time    WBC 7.8 05/17/2022 09:04 AM    RBC 4.61 05/17/2022 09:04 AM    HGB 13.7 05/17/2022 09:04 AM    HCT 42.2 05/17/2022 09:04 AM    MCV 91.5 05/17/2022 09:04 AM    RDW 14.9 05/17/2022 09:04 AM     05/17/2022 09:04 AM       CMP:   Lab Results   Component Value Date/Time     05/17/2022 09:04 AM    K 4.5 05/17/2022 09:04 AM     05/17/2022 09:04 AM    CO2 23 05/17/2022 09:04 AM    BUN 15 05/17/2022 09:04 AM    CREATININE 0.65 05/17/2022 09:04 AM    GFRAA >60.0 05/17/2022 09:04 AM    LABGLOM >60.0 05/17/2022 09:04 AM    GLUCOSE 96 05/17/2022 09:04 AM    PROT 7.2 05/17/2022 09:04 AM    CALCIUM 9.2 05/17/2022 09:04 AM    BILITOT <0.2 05/17/2022 09:04 AM    ALKPHOS 84 05/17/2022 09:04 AM    AST 29 05/17/2022 09:04 AM    ALT 23 05/17/2022 09:04 AM       POC Tests: No results for input(s): POCGLU, POCNA, POCK, POCCL, POCBUN, POCHEMO, POCHCT in the last 72 hours.     Coags: No results found for: PROTIME, INR, APTT    HCG (If Applicable): No results found for: PREGTESTUR, PREGSERUM, HCG, HCGQUANT     ABGs: No results found for: PHART, PO2ART, VIT1DBY, KNK4VOF, BEART, W6WVFDKM     Type & Screen (If Applicable):  No results found for: LABABO, LABRH    Drug/Infectious Status (If Applicable):  No results found for: HIV, HEPCAB    COVID-19 Screening (If Applicable): No results found for: COVID19        Anesthesia Evaluation  Nursing notes reviewed  Airway: Mallampati: III  TM distance: >3 FB   Neck ROM: full  Mouth opening: > = 3 FB   Dental:    (+) other      Pulmonary: breath sounds clear to auscultation  (+) current smoker          Patient did not smoke on day of surgery. Cardiovascular:  Exercise tolerance: no interval change,         NYHA Classification: III    Rhythm: regular  Rate: normal           Beta Blocker:  Not on Beta Blocker         Neuro/Psych:   Negative Neuro/Psych ROS              GI/Hepatic/Renal:   (+) morbid obesity          Endo/Other:                     Abdominal:   (+) obese,     Abdomen: soft. Vascular: negative vascular ROS. Other Findings:           Anesthesia Plan      MAC     ASA 3       Induction: intravenous. continuous noninvasive hemodynamic monitor    Anesthetic plan and risks discussed with patient. Plan discussed with attending.                     PRAVEEN Mckeon - CRNA   7/22/2022

## 2022-07-22 NOTE — H&P
Patient Name: Ximena Nelson  : 1960  MRN: [id number]  DATE: 22      ENDOSCOPY  History and Physical    Procedure:    [] Diagnostic Colonoscopy       [x] Screening Colonoscopy  [x] EGD      [] ERCP      [] EUS       [] Other    [x] Previous office notes/History and Physical reviewed from the patients chart. Please see EMR for further details of HPI. I have examined the patient's status immediately prior to the procedure and:      Indications/HPI:    []Abdominal Pain   []Cancer- GI/Lung  []Fhx of colon CA  []History of Polyps   []Trotters   []Melena  []Abnormal Imaging   []Dysphagia    []Persistent Pneumonia  []Anemia   []Food Impaction  []History of Polyps  []GI Bleed   []Pulmonary nodule/Mass  []Change in bowel habits  [x]Heartburn/Reflux  []Rectal Bleed (BRBPR)  []Chest Pain - Non Cardiac  []Heme (+) Stool  []Ulcers  []Constipation   []Hemoptysis   []Varices  []Diarrhea   []Hypoxemia  []Nausea/Vomiting   [x]Screening   []Crohns/Colitis  []Other:    Anesthesia:   [x] MAC [] Moderate Sedation   [] General   [] None     ROS: 12 pt Review of Symptoms was negative unless mentioned above    Medications:   Prior to Admission medications    Medication Sig Start Date End Date Taking? Authorizing Provider   lisinopril (PRINIVIL;ZESTRIL) 10 MG tablet Take 1 tablet by mouth daily 22   Kyle Stapleton MD   pantoprazole (PROTONIX) 40 MG tablet Take 1 tablet by mouth every morning (before breakfast) 22   Kyle Stapleton MD   acetaminophen (TYLENOL) 500 MG tablet Take 1,000 mg by mouth every 6 hours as needed for Pain    Historical Provider, MD   clobetasol (TEMOVATE) 0.05 % ointment Apply to arms & legs twice a day for up to 3 weeks, take 1 week off.  Repeat for flare  Patient not taking: Reported on 2022   Historical Provider, MD   Multiple Vitamins-Minerals (ONE-A-DAY WOMENS 50+ ADVANTAGE PO) Take by mouth    Historical Provider, MD     Allergies: No Known Allergies   History of allergic

## 2022-09-01 DIAGNOSIS — K21.9 GASTROESOPHAGEAL REFLUX DISEASE WITHOUT ESOPHAGITIS: ICD-10-CM

## 2022-09-01 RX ORDER — PANTOPRAZOLE SODIUM 40 MG/1
40 TABLET, DELAYED RELEASE ORAL
Qty: 90 TABLET | Refills: 0 | Status: SHIPPED | OUTPATIENT
Start: 2022-09-01

## 2022-09-14 ENCOUNTER — TELEPHONE (OUTPATIENT)
Dept: FAMILY MEDICINE CLINIC | Age: 62
End: 2022-09-14

## 2022-11-07 ENCOUNTER — OFFICE VISIT (OUTPATIENT)
Dept: FAMILY MEDICINE CLINIC | Age: 62
End: 2022-11-07
Payer: COMMERCIAL

## 2022-11-07 ENCOUNTER — HOSPITAL ENCOUNTER (INPATIENT)
Age: 62
LOS: 1 days | Discharge: HOME | DRG: 309 | End: 2022-11-08
Attending: EMERGENCY MEDICINE | Admitting: INTERNAL MEDICINE
Payer: COMMERCIAL

## 2022-11-07 ENCOUNTER — APPOINTMENT (OUTPATIENT)
Dept: GENERAL RADIOLOGY | Age: 62
DRG: 309 | End: 2022-11-07
Payer: COMMERCIAL

## 2022-11-07 VITALS
HEART RATE: 98 BPM | BODY MASS INDEX: 51.91 KG/M2 | WEIGHT: 293 LBS | RESPIRATION RATE: 16 BRPM | HEIGHT: 63 IN | DIASTOLIC BLOOD PRESSURE: 84 MMHG | TEMPERATURE: 97.5 F | OXYGEN SATURATION: 98 % | SYSTOLIC BLOOD PRESSURE: 120 MMHG

## 2022-11-07 DIAGNOSIS — I10 HYPERTENSION, UNSPECIFIED TYPE: ICD-10-CM

## 2022-11-07 DIAGNOSIS — I48.91 NEW ONSET ATRIAL FIBRILLATION (HCC): Primary | ICD-10-CM

## 2022-11-07 DIAGNOSIS — I48.91 ATRIAL FIBRILLATION WITH RVR (HCC): Primary | ICD-10-CM

## 2022-11-07 LAB
ALBUMIN SERPL-MCNC: 3.7 G/DL (ref 3.5–4.6)
ALP BLD-CCNC: 79 U/L (ref 40–130)
ALT SERPL-CCNC: 31 U/L (ref 0–33)
ANION GAP SERPL CALCULATED.3IONS-SCNC: 10 MEQ/L (ref 9–15)
AST SERPL-CCNC: 32 U/L (ref 0–35)
BASOPHILS ABSOLUTE: 0.1 K/UL (ref 0–0.2)
BASOPHILS RELATIVE PERCENT: 0.9 %
BILIRUB SERPL-MCNC: <0.2 MG/DL (ref 0.2–0.7)
BUN BLDV-MCNC: 17 MG/DL (ref 8–23)
CALCIUM SERPL-MCNC: 9.6 MG/DL (ref 8.5–9.9)
CHLORIDE BLD-SCNC: 105 MEQ/L (ref 95–107)
CO2: 22 MEQ/L (ref 20–31)
CREAT SERPL-MCNC: 0.63 MG/DL (ref 0.5–0.9)
EOSINOPHILS ABSOLUTE: 0.6 K/UL (ref 0–0.7)
EOSINOPHILS RELATIVE PERCENT: 7.2 %
GFR SERPL CREATININE-BSD FRML MDRD: >60 ML/MIN/{1.73_M2}
GLOBULIN: 3.4 G/DL (ref 2.3–3.5)
GLUCOSE BLD-MCNC: 108 MG/DL (ref 70–99)
HCT VFR BLD CALC: 42.3 % (ref 37–47)
HEMOGLOBIN: 14.3 G/DL (ref 12–16)
INR BLD: 0.9
LV EF: 50 %
LVEF MODALITY: NORMAL
LYMPHOCYTES ABSOLUTE: 2.8 K/UL (ref 1–4.8)
LYMPHOCYTES RELATIVE PERCENT: 32.3 %
MAGNESIUM: 2.2 MG/DL (ref 1.7–2.4)
MCH RBC QN AUTO: 30.3 PG (ref 27–31.3)
MCHC RBC AUTO-ENTMCNC: 33.8 % (ref 33–37)
MCV RBC AUTO: 89.8 FL (ref 79.4–94.8)
MONOCYTES ABSOLUTE: 0.9 K/UL (ref 0.2–0.8)
MONOCYTES RELATIVE PERCENT: 10.3 %
NEUTROPHILS ABSOLUTE: 4.3 K/UL (ref 1.4–6.5)
NEUTROPHILS RELATIVE PERCENT: 49.3 %
PDW BLD-RTO: 14.7 % (ref 11.5–14.5)
PLATELET # BLD: 239 K/UL (ref 130–400)
POTASSIUM SERPL-SCNC: 4 MEQ/L (ref 3.4–4.9)
PROTHROMBIN TIME: 12.6 SEC (ref 12.3–14.9)
RBC # BLD: 4.71 M/UL (ref 4.2–5.4)
SODIUM BLD-SCNC: 137 MEQ/L (ref 135–144)
TOTAL PROTEIN: 7.1 G/DL (ref 6.3–8)
TROPONIN: <0.01 NG/ML (ref 0–0.01)
TSH REFLEX: 3.29 UIU/ML (ref 0.44–3.86)
WBC # BLD: 8.7 K/UL (ref 4.8–10.8)

## 2022-11-07 PROCEDURE — 6360000002 HC RX W HCPCS: Performed by: EMERGENCY MEDICINE

## 2022-11-07 PROCEDURE — 80053 COMPREHEN METABOLIC PANEL: CPT

## 2022-11-07 PROCEDURE — 93005 ELECTROCARDIOGRAM TRACING: CPT | Performed by: EMERGENCY MEDICINE

## 2022-11-07 PROCEDURE — 36415 COLL VENOUS BLD VENIPUNCTURE: CPT

## 2022-11-07 PROCEDURE — 96376 TX/PRO/DX INJ SAME DRUG ADON: CPT

## 2022-11-07 PROCEDURE — 3074F SYST BP LT 130 MM HG: CPT | Performed by: FAMILY MEDICINE

## 2022-11-07 PROCEDURE — 93306 TTE W/DOPPLER COMPLETE: CPT

## 2022-11-07 PROCEDURE — 85610 PROTHROMBIN TIME: CPT

## 2022-11-07 PROCEDURE — 96365 THER/PROPH/DIAG IV INF INIT: CPT

## 2022-11-07 PROCEDURE — 84484 ASSAY OF TROPONIN QUANT: CPT

## 2022-11-07 PROCEDURE — 2580000003 HC RX 258: Performed by: EMERGENCY MEDICINE

## 2022-11-07 PROCEDURE — 93000 ELECTROCARDIOGRAM COMPLETE: CPT | Performed by: FAMILY MEDICINE

## 2022-11-07 PROCEDURE — 99223 1ST HOSP IP/OBS HIGH 75: CPT | Performed by: INTERNAL MEDICINE

## 2022-11-07 PROCEDURE — 99214 OFFICE O/P EST MOD 30 MIN: CPT | Performed by: FAMILY MEDICINE

## 2022-11-07 PROCEDURE — 83735 ASSAY OF MAGNESIUM: CPT

## 2022-11-07 PROCEDURE — 96361 HYDRATE IV INFUSION ADD-ON: CPT

## 2022-11-07 PROCEDURE — 2060000000 HC ICU INTERMEDIATE R&B

## 2022-11-07 PROCEDURE — 85025 COMPLETE CBC W/AUTO DIFF WBC: CPT

## 2022-11-07 PROCEDURE — 2500000003 HC RX 250 WO HCPCS: Performed by: EMERGENCY MEDICINE

## 2022-11-07 PROCEDURE — 3078F DIAST BP <80 MM HG: CPT | Performed by: FAMILY MEDICINE

## 2022-11-07 PROCEDURE — 6360000002 HC RX W HCPCS: Performed by: INTERNAL MEDICINE

## 2022-11-07 PROCEDURE — 99285 EMERGENCY DEPT VISIT HI MDM: CPT

## 2022-11-07 PROCEDURE — 84443 ASSAY THYROID STIM HORMONE: CPT

## 2022-11-07 PROCEDURE — 71045 X-RAY EXAM CHEST 1 VIEW: CPT

## 2022-11-07 PROCEDURE — 6370000000 HC RX 637 (ALT 250 FOR IP): Performed by: INTERNAL MEDICINE

## 2022-11-07 RX ORDER — PANTOPRAZOLE SODIUM 40 MG/1
40 TABLET, DELAYED RELEASE ORAL
Qty: 90 TABLET | Refills: 1 | Status: CANCELLED | OUTPATIENT
Start: 2022-11-07

## 2022-11-07 RX ORDER — ENOXAPARIN SODIUM 150 MG/ML
1 INJECTION SUBCUTANEOUS ONCE
Status: COMPLETED | OUTPATIENT
Start: 2022-11-07 | End: 2022-11-07

## 2022-11-07 RX ORDER — SODIUM CHLORIDE 9 MG/ML
INJECTION, SOLUTION INTRAVENOUS PRN
Status: DISCONTINUED | OUTPATIENT
Start: 2022-11-07 | End: 2022-11-08 | Stop reason: HOSPADM

## 2022-11-07 RX ORDER — 0.9 % SODIUM CHLORIDE 0.9 %
500 INTRAVENOUS SOLUTION INTRAVENOUS ONCE
Status: COMPLETED | OUTPATIENT
Start: 2022-11-07 | End: 2022-11-07

## 2022-11-07 RX ORDER — DILTIAZEM HYDROCHLORIDE 5 MG/ML
20 INJECTION INTRAVENOUS ONCE
Status: COMPLETED | OUTPATIENT
Start: 2022-11-07 | End: 2022-11-07

## 2022-11-07 RX ORDER — METOPROLOL TARTRATE 50 MG/1
50 TABLET, FILM COATED ORAL 2 TIMES DAILY
Status: DISCONTINUED | OUTPATIENT
Start: 2022-11-07 | End: 2022-11-08 | Stop reason: HOSPADM

## 2022-11-07 RX ORDER — LISINOPRIL 10 MG/1
10 TABLET ORAL DAILY
Qty: 90 TABLET | Refills: 0 | Status: CANCELLED | OUTPATIENT
Start: 2022-11-07

## 2022-11-07 RX ORDER — ONDANSETRON 2 MG/ML
4 INJECTION INTRAMUSCULAR; INTRAVENOUS EVERY 6 HOURS PRN
Status: DISCONTINUED | OUTPATIENT
Start: 2022-11-07 | End: 2022-11-08 | Stop reason: HOSPADM

## 2022-11-07 RX ORDER — ACETAMINOPHEN 325 MG/1
650 TABLET ORAL EVERY 4 HOURS PRN
Status: DISCONTINUED | OUTPATIENT
Start: 2022-11-07 | End: 2022-11-08 | Stop reason: HOSPADM

## 2022-11-07 RX ORDER — ONDANSETRON 4 MG/1
4 TABLET, ORALLY DISINTEGRATING ORAL EVERY 8 HOURS PRN
Status: DISCONTINUED | OUTPATIENT
Start: 2022-11-07 | End: 2022-11-08 | Stop reason: HOSPADM

## 2022-11-07 RX ORDER — SODIUM CHLORIDE 0.9 % (FLUSH) 0.9 %
5-40 SYRINGE (ML) INJECTION EVERY 12 HOURS SCHEDULED
Status: DISCONTINUED | OUTPATIENT
Start: 2022-11-07 | End: 2022-11-08 | Stop reason: HOSPADM

## 2022-11-07 RX ORDER — SODIUM CHLORIDE 0.9 % (FLUSH) 0.9 %
5-40 SYRINGE (ML) INJECTION PRN
Status: DISCONTINUED | OUTPATIENT
Start: 2022-11-07 | End: 2022-11-08 | Stop reason: HOSPADM

## 2022-11-07 RX ORDER — ENOXAPARIN SODIUM 150 MG/ML
1 INJECTION SUBCUTANEOUS 2 TIMES DAILY
Status: DISCONTINUED | OUTPATIENT
Start: 2022-11-07 | End: 2022-11-08

## 2022-11-07 RX ORDER — PANTOPRAZOLE SODIUM 40 MG/1
40 TABLET, DELAYED RELEASE ORAL
Status: DISCONTINUED | OUTPATIENT
Start: 2022-11-08 | End: 2022-11-08 | Stop reason: HOSPADM

## 2022-11-07 RX ADMIN — ENOXAPARIN SODIUM 135 MG: 150 INJECTION SUBCUTANEOUS at 14:21

## 2022-11-07 RX ADMIN — DILTIAZEM HYDROCHLORIDE 5 MG/HR: 5 INJECTION, SOLUTION INTRAVENOUS at 12:59

## 2022-11-07 RX ADMIN — METOPROLOL TARTRATE 50 MG: 50 TABLET, FILM COATED ORAL at 20:30

## 2022-11-07 RX ADMIN — ENOXAPARIN SODIUM 135 MG: 150 INJECTION SUBCUTANEOUS at 20:30

## 2022-11-07 RX ADMIN — DILTIAZEM HYDROCHLORIDE 20 MG: 5 INJECTION, SOLUTION INTRAVENOUS at 11:50

## 2022-11-07 RX ADMIN — Medication 10 ML: at 20:29

## 2022-11-07 RX ADMIN — SODIUM CHLORIDE 500 ML: 9 INJECTION, SOLUTION INTRAVENOUS at 11:48

## 2022-11-07 ASSESSMENT — PATIENT HEALTH QUESTIONNAIRE - PHQ9
SUM OF ALL RESPONSES TO PHQ QUESTIONS 1-9: 0
2. FEELING DOWN, DEPRESSED OR HOPELESS: 0
SUM OF ALL RESPONSES TO PHQ9 QUESTIONS 1 & 2: 0
1. LITTLE INTEREST OR PLEASURE IN DOING THINGS: 0
SUM OF ALL RESPONSES TO PHQ QUESTIONS 1-9: 0

## 2022-11-07 ASSESSMENT — ENCOUNTER SYMPTOMS
VOMITING: 0
WHEEZING: 0
GASTROINTESTINAL NEGATIVE: 1
DIARRHEA: 0
RESPIRATORY NEGATIVE: 1
EYES NEGATIVE: 1
SHORTNESS OF BREATH: 0
CHEST TIGHTNESS: 0
COUGH: 0
BLOOD IN STOOL: 0
NAUSEA: 0
STRIDOR: 0

## 2022-11-07 ASSESSMENT — PAIN - FUNCTIONAL ASSESSMENT: PAIN_FUNCTIONAL_ASSESSMENT: NONE - DENIES PAIN

## 2022-11-07 NOTE — CARE COORDINATION
[facility] Case Management Initial Discharge Assessment    Met with Patient to discuss discharge plan. PCP: Leandra Huerta MD                                Date of Last Visit: 5/22    [facility] Patient: No        [facility] Notified: no    If no PCP, list provided? N/A    Discharge Planning    Living Arrangements: independently at home    Who do you live with? ALONE MOST OF THE TIME  IS A  GONE M-F HOME ON THE WEEKENDS    Who helps you with your care:  self    If lives at home:     Do you have any barriers navigating in your home? no    Patient can perform ADL? Yes INDEPENDENT W ALL ADLS, AMBULATES INDEPENDENTLY     Current Services (outpatient and in home) :  None    Dialysis: No    Is transportation available to get to your appointments? Yes    DME Equipment:  no    Respiratory equipment: None    Respiratory provider:  no     Pharmacy:  yes - [facility] with Medication Assistance Program?  No      [facility]? N[facility]A    Patient agreeable to SNF/Rehab? N/A    Other discharge needs identified? Other PT WILL DOAC COUPON GIVEN AT D/C AWAIT ON CARDIOLOGY INPUT    Does Patient Have a High-Risk for Readmission Diagnosis (CHF, PN, MI, COPD)? No      Initial Discharge Plan? (Note: please see concurrent daily documentation for any updates after initial note).     RETURN HOME W SPOUSE WILL NEED DOAC COUPON    Readmission Risk              Risk of Unplanned Readmission:  0         Electronically signed by Breanna Sierra RN on 11/7/2022 at 2:43 PM

## 2022-11-07 NOTE — H&P
History and Physical  Patient: Margarita Kirkland  Unit/Bed: R443/V708-03  YOB: 1960  MRN: [id number]  Acct: [de-identified]   Admitting Diagnosis: A-fib [facility]) [I48.91]  Atrial fibrillation with RVR ([facility] [address] [I48.91]  Admit Date:  2022  Hospital Day: 0      Chief Complaint: AF      History of Present Illness: pt was sent to ER from PCP office due to irregular HR. Initial . Pt knew her HR was irregular and fast.  SHe had this in the past as well at least a couple times. Initial labs to include TSH and Mag/K WNL  At 1537 hrs she converted to SR. She feels better.      EKG: AF  PMHx:  Past Medical History:   Diagnosis Date    Hypertension     Obesity        PSHx:  Past Surgical History:   Procedure Laterality Date    CARPAL TUNNEL RELEASE      COLONOSCOPY N/A 2022    COLORECTAL CANCER SCREENING, NOT HIGH RISK performed by Sanna Mcdonald MD at [address] N/A 2022    EGD DIAGNOSTIC ONLY performed by Sanna Mcdonald MD at [address] Hx:  Social History     Socioeconomic History    Marital status:      Spouse name: None    Number of children: None    Years of education: None    Highest education level: None   Tobacco Use    Smoking status: Former     Packs/day: 1.00     Years: 7.00     Pack years: 7.00     Types: Cigarettes     Quit date: 1984     Years since quittin.8    Smokeless tobacco: Never   Vaping Use    Vaping Use: Never used   Substance and Sexual Activity    Alcohol use: Not Currently     Comment: very occationally    Drug use: Never    Sexual activity: Yes     Partners: Male     Social Determinants of Health     Financial Resource Strain: Low Risk     Difficulty of Paying Living Expenses: Not hard at all   Food Insecurity: No Food Insecurity    Worried About Running Out of Food in the Last Year: Never true    Ran Out of Food in the Last Year: Never true   Transportation Needs: No Transportation Needs    Lack of Transportation (Medical): No    Lack of Transportation (Non-Medical): No       Family Hx:  Family History   Problem Relation Age of Onset    Heart Attack Paternal Grandmother     Stroke Maternal Grandmother     Cancer Maternal Grandmother     Heart Disease Father     Hypertension Father     Diabetes Mother     Heart Disease Mother     Liver Cancer Brother     Diabetes Sister     Heart Defect Sister     Colon Cancer Neg Hx        No Known Allergies    Current Facility-Administered Medications   Medication Dose Route Frequency Provider Last Rate Last Admin    sodium chloride flush 0.9 % injection 5-40 mL  5-40 mL IntraVENous 2 times per day Octavio Hester MD        sodium chloride flush 0.9 % injection 5-40 mL  5-40 mL IntraVENous PRN Octavio Hester MD        0.9 % sodium chloride infusion   IntraVENous PRN Octavio Hester MD        acetaminophen (TYLENOL) tablet 650 mg  650 mg Oral Q4H PRN Octavio Hester MD        ondansetron (ZOFRAN-ODT) disintegrating tablet 4 mg  4 mg Oral Q8H PRN Octavio Hester MD        Or    ondansetron [facility]) injection 4 mg  4 mg IntraVENous Q6H PRNERI Hester MD        [START ON 11/8/2022] pantoprazole (PROTONIX) tablet 40 mg  40 mg Oral QAM AC Shayla Villeda MD        metoprolol tartrate (LOPRESSOR) tablet 50 mg  50 mg Oral BID Shayla Villeda MD        enoxaparin (LOVENOX) injection 135 mg  1 mg/kg SubCUTAneous BID Shayla Villeda MD           Review of Systems:   Review of Systems   Constitutional: Negative. Negative for diaphoresis and fatigue. HENT: Negative. Eyes: Negative. Respiratory: Negative. Negative for cough, chest tightness, shortness of breath, wheezing and stridor. Cardiovascular:  Positive for palpitations. Negative for chest pain and leg swelling. Gastrointestinal: Negative. Negative for blood in stool and nausea. Genitourinary: Negative. Musculoskeletal: Negative. Skin: Negative. Neurological: Negative.   Negative for dizziness, syncope, weakness and light-headedness. Hematological: Negative. Psychiatric/Behavioral: Negative. Physical Examination:    /82   Pulse 84   Temp 98.3 °F (36.8 °C) (Oral)   Resp 18   Ht 5' 3\" (1.6 m)   Wt (!) 306 lb (138.8 kg)   LMP 01/01/2015 (Within Months)   SpO2 99%   BMI 54.21 kg/m²    Physical Exam   Constitutional: She appears healthy. No distress. HENT:   Normal cephalic and Atraumatic   Eyes: Pupils are equal, round, and reactive to light. Neck: Thyroid normal. No JVD present. No neck adenopathy. No thyromegaly present. Cardiovascular: Normal rate, regular rhythm, normal heart sounds, intact distal pulses and normal pulses. Pulmonary/Chest: Effort normal and breath sounds normal. She has no wheezes. She has no rales. She exhibits no tenderness. Abdominal: Soft. Bowel sounds are normal. There is no abdominal tenderness. Musculoskeletal:         General: No tenderness or edema. Normal range of motion. Cervical back: Normal range of motion and neck supple. Neurological: She is alert and oriented to person, place, and time. Skin: Skin is warm. No cyanosis. Nails show no clubbing.        LABS:  CBC:   Lab Results   Component Value Date/Time    WBC 8.7 11/07/2022 11:45 AM    RBC 4.71 11/07/2022 11:45 AM    HGB 14.3 11/07/2022 11:45 AM    HCT 42.3 11/07/2022 11:45 AM    MCV 89.8 11/07/2022 11:45 AM    MCH 30.3 11/07/2022 11:45 AM    MCHC 33.8 11/07/2022 11:45 AM    RDW 14.7 11/07/2022 11:45 AM     11/07/2022 11:45 AM     CBC with Differential:    Lab Results   Component Value Date/Time    WBC 8.7 11/07/2022 11:45 AM    RBC 4.71 11/07/2022 11:45 AM    HGB 14.3 11/07/2022 11:45 AM    HCT 42.3 11/07/2022 11:45 AM     11/07/2022 11:45 AM    MCV 89.8 11/07/2022 11:45 AM    MCH 30.3 11/07/2022 11:45 AM    MCHC 33.8 11/07/2022 11:45 AM    RDW 14.7 11/07/2022 11:45 AM    LYMPHOPCT 32.3 11/07/2022 11:45 AM    MONOPCT 10.3 11/07/2022 11:45 AM BASOPCT 0.9 11/07/2022 11:45 AM    MONOSABS 0.9 11/07/2022 11:45 AM    LYMPHSABS 2.8 11/07/2022 11:45 AM    EOSABS 0.6 11/07/2022 11:45 AM    BASOSABS 0.1 11/07/2022 11:45 AM     CMP:    Lab Results   Component Value Date/Time     11/07/2022 11:45 AM    K 4.0 11/07/2022 11:45 AM     11/07/2022 11:45 AM    CO2 22 11/07/2022 11:45 AM    BUN 17 11/07/2022 11:45 AM    CREATININE 0.63 11/07/2022 11:45 AM    GFRAA >60.0 05/17/2022 09:04 AM    LABGLOM >60.0 11/07/2022 11:45 AM    GLUCOSE 108 11/07/2022 11:45 AM    PROT 7.1 11/07/2022 11:45 AM    LABALBU 3.7 11/07/2022 11:45 AM    CALCIUM 9.6 11/07/2022 11:45 AM    BILITOT <0.2 11/07/2022 11:45 AM    ALKPHOS 79 11/07/2022 11:45 AM    AST 32 11/07/2022 11:45 AM    ALT 31 11/07/2022 11:45 AM     BMP:    Lab Results   Component Value Date/Time     11/07/2022 11:45 AM    K 4.0 11/07/2022 11:45 AM     11/07/2022 11:45 AM    CO2 22 11/07/2022 11:45 AM    BUN 17 11/07/2022 11:45 AM    LABALBU 3.7 11/07/2022 11:45 AM    CREATININE 0.63 11/07/2022 11:45 AM    CALCIUM 9.6 11/07/2022 11:45 AM    GFRAA >60.0 05/17/2022 09:04 AM    LABGLOM >60.0 11/07/2022 11:45 AM    GLUCOSE 108 11/07/2022 11:45 AM     Magnesium:    Lab Results   Component Value Date/Time    MG 2.2 11/07/2022 11:45 AM     Troponin:    Lab Results   Component Value Date/Time    TROPONINI <0.010 11/07/2022 11:45 AM       Active Hospital Problems    Diagnosis Date Noted    A-fib [facility]) [I48.91] 11/07/2022     Priority: Medium        Assessment/Plan:  PAF- rate control. Dc CCB gtt. Add PO BB. Lovenox today. Will change to DOAC. Serial Trops  Telemetry  HTN Stable. Hold ACEI today.         Electronically signed by Betty Villa MD on 11/7/2022 at 5:42 PM

## 2022-11-07 NOTE — CARE COORDINATION
11/07/22    From: HOME W SPOUSE, INDEPENDENT SENT BY DR Cristobal Blanc     Admit:NEW ONSET A-FIB RVR    PMH:HTN, OBESITY    Anticipated Discharge Disposition: RETURN HOME     Patient Mobility or PT/OT ordered:N/A INDEPENDENT    Consults: CHO (ADMITTING)    Clinical: 120'S-A-FIB--RA  BP'S ADEQUATE--ECHO DONE IN ED--    Barriers to Discharge:  CONSULT NEEDS TO SEE  CARDIZEM GTT    Assessments: CMI DONE

## 2022-11-07 NOTE — PROGRESS NOTES
Admission assessment completed. Patient lungs clear upon auscultation. MA72, Sinus Tach on tele. Patient denies pain or discomfort. Patient ambulates independently to bathroom. Continent of bowel and bladder. Call light in reach.

## 2022-11-07 NOTE — PROGRESS NOTES
Spiritual Care Services     Summary of Visit:  Pt admitted unexpectedly from her [de-identified] office she said. Her dtr Jesus Kolb with her. Shared the story of being fully healed from asthma in the past, and expecting the same now. Believes in God and in hte power of prayer. Prayed that the pt would receive all that she needs to receive full and complete healing at this time. Encounter Summary  Encounter Overview/Reason : Initial Encounter  Service Provided For[de-identified] Patient and family together  Referral/Consult From[de-identified] Rounding  Support System: Spouse, Children  Complexity of Encounter: Low  Begin Time: 7112  End Time : 6861  Total Time Calculated: 15 min  Encounter   Type: Initial Screen/Assessment     Spiritual/Emotional needs  Type: Spiritual Support      Spiritual Assessment/Intervention/Outcomes:    Assessment: Calm, Coping, Hopeful    Intervention: Active listening, Discussed meaning/purpose, Discussed belief system/Yazidi practices/sharonda, Discussed relationship with God, Discussed illness injury and its impact, Prayer (assurance of)/[place]    Outcome: Expressed feelings of Naomi, Peace and/or Love, Expressed Gratitude      Care Plan:    Plan and Referrals  Plan/Referrals: Continue Support (comment)    [id number] [address]   Electronically signed by [facility] on 11/7/2022 at 4:51 PM.    To reach a  for emotional and spiritual support, place an [facility] consult request.   If a  is needed immediately, dial 0 and ask to page the on-call .

## 2022-11-07 NOTE — ED PROVIDER NOTES
[facility] ED  EMERGENCY DEPARTMENT ENCOUNTER      Pt Name: Alexia Albrecht  MRN: [id number]  Terrellgftanya 1960  Date of evaluation: 11/7/2022  Provider: Rachelle Will MD    CHIEF COMPLAINT       Chief Complaint   Patient presents with    Atrial Fibrillation     Patient sent from PCP office for new onset afib rvr         HISTORY OF PRESENT ILLNESS   (Location/Symptom, Timing/Onset, Context/Setting, Quality, Duration, Modifying Factors, Severity)  Note limiting factors. 20-year-old female presenting from primary care office for Afib with RVR. No history of this in the past.  She denies any symptoms presently. She is not on any blood thinners. Hx of HTN only. Nursing Notes were reviewed. REVIEW OF SYSTEMS    (2-9 systems for level 4, 10 or more for level 5)     Review of Systems   All other systems reviewed and are negative. Except as noted above the remainder of the review of systems was reviewed and negative. PAST MEDICAL HISTORY     Past Medical History:   Diagnosis Date    Hypertension     Obesity          SURGICAL HISTORY       Past Surgical History:   Procedure Laterality Date    CARPAL TUNNEL RELEASE      COLONOSCOPY N/A 7/22/2022    COLORECTAL CANCER SCREENING, NOT HIGH RISK performed by Akbar Dorman MD at [facility] 7/22/2022    EGD DIAGNOSTIC ONLY performed by Akbar Dorman MD at [facility]       Previous Medications    ACETAMINOPHEN (TYLENOL) 500 MG TABLET    Take 1,000 mg by mouth every 6 hours as needed for Pain    LISINOPRIL (PRINIVIL;ZESTRIL) 10 MG TABLET    Take 1 tablet by mouth daily    MULTIPLE VITAMINS-MINERALS (ONE-A-DAY WOMENS 50+ ADVANTAGE PO)    Take by mouth    PANTOPRAZOLE (PROTONIX) 40 MG TABLET    Take 1 tablet by mouth every morning (before breakfast)       ALLERGIES     Patient has no known allergies.     FAMILY HISTORY       Family History   Problem Relation Age of Onset    Heart Attack Paternal Grandmother     Stroke Maternal Grandmother     Cancer Maternal Grandmother     Heart Disease Father     Hypertension Father     Diabetes Mother     Heart Disease Mother     Liver Cancer Brother     Diabetes Sister     Heart Defect Sister     Colon Cancer Neg Hx           SOCIAL HISTORY       Social History     Socioeconomic History    Marital status:      Spouse name: None    Number of children: None    Years of education: None    Highest education level: None   Tobacco Use    Smoking status: Former     Packs/day: 1.00     Years: 7.00     Pack years: 7.00     Types: Cigarettes     Quit date: 1984     Years since quittin.8    Smokeless tobacco: Never   Vaping Use    Vaping Use: Never used   Substance and Sexual Activity    Alcohol use: Not Currently     Comment: very occationally    Drug use: Never    Sexual activity: Yes     Partners: Male     Social Determinants of Health     Financial Resource Strain: Low Risk     Difficulty of Paying Living Expenses: Not hard at all   Food Insecurity: No Food Insecurity    Worried About Running Out of Food in the Last Year: Never true    Ran Out of Food in the Last Year: Never true   Transportation Needs: No Transportation Needs    Lack of Transportation (Medical): No    Lack of Transportation (Non-Medical): No       SCREENINGS    Ketty Coma Scale  Eye Opening: Spontaneous  Best Verbal Response: Oriented  Best Motor Response: Obeys commands  Ketty Coma Scale Score: 15          PHYSICAL EXAM    (up to 7 for level 4, 8 or more for level 5)     ED Triage Vitals   BP Temp Temp Source Heart Rate Resp SpO2 Height Weight   22 1141 22 1143 22 1143 22 1141 22 1141 22 1141 22 1141 22 1141   111/79 98.2 °F (36.8 °C) Oral (!) 144 19 96 % 5' 3\" (1.6 m) (!) 306 lb (138.8 kg)       Physical Exam  Vitals and nursing note reviewed.    Constitutional:       General: She is not in acute distress. Appearance: Normal appearance. She is well-developed. She is not ill-appearing. HENT:      Head: Normocephalic and atraumatic. Mouth/Throat:      Mouth: Mucous membranes are moist.      Pharynx: Oropharynx is clear. Eyes:      Extraocular Movements: Extraocular movements intact. Conjunctiva/sclera: Conjunctivae normal.   Cardiovascular:      Rate and Rhythm: Tachycardia present. Rhythm irregular. Pulmonary:      Effort: Pulmonary effort is normal.      Breath sounds: Normal breath sounds. Abdominal:      General: Bowel sounds are normal.      Palpations: Abdomen is soft. Tenderness: There is no abdominal tenderness. Musculoskeletal:         General: No deformity. Normal range of motion. Cervical back: Normal range of motion and neck supple. Skin:     General: Skin is warm and dry. Capillary Refill: Capillary refill takes less than 2 seconds. Neurological:      General: No focal deficit present. Mental Status: She is alert and oriented to person, place, and time. Mental status is at baseline. Cranial Nerves: No cranial nerve deficit. Psychiatric:         Thought Content: Thought content normal.       DIAGNOSTIC RESULTS     EKG: All EKG's are interpreted by the Emergency Department Physician who either signs or Co-signs this chart in the absence of a cardiologist.    Afib w RVR, rate 146, normal intervals, no ST elevation/ depression    RADIOLOGY:   Non-plain film images such as CT, Ultrasound and MRI are read by the radiologist. Plain radiographic images are visualized and preliminarily interpreted by the emergency physician with the below findings:    Interpretation per the Radiologist below, if available at the time of this note:    XR CHEST PORTABLE   Final Result   No acute process.              LABS:  Labs Reviewed   COMPREHENSIVE METABOLIC PANEL - Abnormal; Notable for the following components:       Result Value    Glucose 108 (*)     All other components within normal limits   CBC WITH AUTO DIFFERENTIAL - Abnormal; Notable for the following components:    RDW 14.7 (*)     Monocytes Absolute 0.9 (*)     All other components within normal limits   TROPONIN   MAGNESIUM   PROTIME-INR   TSH WITH REFLEX       All other labs were within normal range or not returned as of this dictation. EMERGENCY DEPARTMENT COURSE and DIFFERENTIAL DIAGNOSIS/MDM:   Vitals:    Vitals:    11/07/22 1255 11/07/22 1303 11/07/22 1305 11/07/22 1327   BP:  106/78 106/78 124/74   Pulse: (!) 129 (!) 135 (!) 122 (!) 129   Resp: 15 22 20 19   Temp:       TempSrc:       SpO2: 100% 99% 97% 100%   Weight:       Height:           MDM  Number of Diagnoses or Management Options  Atrial fibrillation with RVR (HCC)  Diagnosis management comments: Heart rate temporarily improves into low 100s after Cardizem bolus. Patient then gradually becomes more tachycardic and she is started on an infusion. Denies complaints throughout ED course. Spoke with Dr. Sarah Gordon who accepts admission. Given Lovenox for new onset afib. Procedures    CRITICAL CARE TIME   Total Critical Care time was 40 minutes, excluding separately reportable procedures. There was a high probability of clinically significant/life threatening deterioration in the patient's condition which required my urgent intervention. FINAL IMPRESSION      1.  Atrial fibrillation with RVR [facility])          DISPOSITION/PLAN   DISPOSITION Admitted 11/07/2022 01:42:50 PM      (Please note that portions of this note were completed with a voice recognition program.  Efforts were made to edit the dictations but occasionally words are mis-transcribed.)    Valerie Finley MD (electronically signed)  Attending Emergency Physician        Valerie Finley MD  11/07/22 1961

## 2022-11-07 NOTE — PROGRESS NOTES
Subjective:      Patient ID: Jayla Robles is a 58 y.o. female. Heart Problem  This is a new problem. Pertinent negatives include no chills, fever, vomiting or weakness. Here in follow up for htn. No chest pain or sob. Review of Systems   Constitutional:  Negative for chills and fever. Gastrointestinal:  Negative for diarrhea and vomiting. Neurological:  Negative for weakness. Treatment Adherence:   Medication compliance:  compliant most of the time  Diet compliance:  compliant most of the time  Weight trend: increasing  Current exercise: no regular exercise  Barriers: none    Hypertension:  Home blood pressure monitoring: No.  She is adherent to a low sodium diet. Patient denies chest pain. Antihypertensive medication side effects: no medication side effects noted. Use of agents associated with hypertension: none. Sodium (mEq/L)   Date Value   2022 141    BUN (mg/dL)   Date Value   2022 15    Glucose (mg/dL)   Date Value   2022 96      Potassium (mEq/L)   Date Value   2022 4.5    Creatinine (mg/dL)   Date Value   2022 0.65         Hyperlipidemia:  No new myalgias or GI upset on none.      Lab Results   Component Value Date    CHOL 191 2022    TRIG 102 2022    HDL 46 2022    LDLCALC 125 2022     Lab Results   Component Value Date    ALT 23 2022    AST 29 2022      Reviewed allergy, medical, social, surgical, family and med list changes and updated   files   Social History     Socioeconomic History    Marital status:    Tobacco Use    Smoking status: Former     Packs/day: 1.00     Years: 7.00     Pack years: 7.00     Types: Cigarettes     Quit date: 1984     Years since quittin.8    Smokeless tobacco: Never   Vaping Use    Vaping Use: Never used   Substance and Sexual Activity    Alcohol use: Not Currently     Comment: very occationally    Drug use: Never    Sexual activity: Yes Partners: Male     Social Determinants of Health     Financial Resource Strain: Low Risk     Difficulty of Paying Living Expenses: Not hard at all   Food Insecurity: No Food Insecurity    Worried About [address] in the Last Year: Never true    [facility] in the Last Year: Never true   Transportation Needs: No Transportation Needs    Lack of Transportation (Medical): No    Lack of Transportation (Non-Medical): No     Current Outpatient Medications   Medication Sig Dispense Refill    pantoprazole (PROTONIX) 40 MG tablet Take 1 tablet by mouth every morning (before breakfast) 90 tablet 0    lisinopril (PRINIVIL;ZESTRIL) 10 MG tablet Take 1 tablet by mouth daily 90 tablet 1    acetaminophen (TYLENOL) 500 MG tablet Take 1,000 mg by mouth every 6 hours as needed for Pain      Multiple Vitamins-Minerals (ONE-A-DAY WOMENS 50+ ADVANTAGE PO) Take by mouth       No current facility-administered medications for this visit. Family History   Problem Relation Age of Onset    Heart Attack Paternal Grandmother     Stroke Maternal Grandmother     Cancer Maternal Grandmother     Heart Disease Father     Hypertension Father     Diabetes Mother     Heart Disease Mother     Liver Cancer Brother     Diabetes Sister     Heart Defect Sister     Colon Cancer Neg Hx      Past Medical History:   Diagnosis Date    Hypertension     Obesity       Objective:   Physical Exam  Neck:no carotid bruits. No masses. No adenopathy. No thyroid asymmetry. Lungs:clear and equal breath sounds. No wheezes or rales. Heart: No murmur. No gallops   Pulses:Radials 2+ equal               D.P  1+ equal  Extremities:no edema in either leg  Gen: In no acute distress  Abdomen; B.S present. Soft  Non tender. No hepatosplenomegaly. No masses    Assessment / Plan:       Diagnosis Orders   1. New onset atrial fibrillation ([address]        2.  Hypertension, unspecified type               Orders Placed This Encounter   Procedures    EKG 12 Lead Order Specific Question:   Reason for Exam?     Answer:   Irregular heart rate    To er for further evaluation-squad summoned

## 2022-11-08 VITALS
HEIGHT: 63 IN | TEMPERATURE: 97.9 F | DIASTOLIC BLOOD PRESSURE: 69 MMHG | HEART RATE: 81 BPM | SYSTOLIC BLOOD PRESSURE: 105 MMHG | WEIGHT: 293 LBS | BODY MASS INDEX: 51.91 KG/M2 | RESPIRATION RATE: 18 BRPM | OXYGEN SATURATION: 98 %

## 2022-11-08 LAB
EKG ATRIAL RATE: 75 BPM
EKG P AXIS: 54 DEGREES
EKG P-R INTERVAL: 168 MS
EKG Q-T INTERVAL: 396 MS
EKG QRS DURATION: 102 MS
EKG QTC CALCULATION (BAZETT): 442 MS
EKG R AXIS: -27 DEGREES
EKG T AXIS: 3 DEGREES
EKG VENTRICULAR RATE: 75 BPM
TROPONIN: <0.01 NG/ML (ref 0–0.01)
TROPONIN: <0.01 NG/ML (ref 0–0.01)

## 2022-11-08 PROCEDURE — 93005 ELECTROCARDIOGRAM TRACING: CPT | Performed by: INTERNAL MEDICINE

## 2022-11-08 PROCEDURE — 93010 ELECTROCARDIOGRAM REPORT: CPT | Performed by: INTERNAL MEDICINE

## 2022-11-08 PROCEDURE — 99238 HOSP IP/OBS DSCHRG MGMT 30/<: CPT | Performed by: INTERNAL MEDICINE

## 2022-11-08 PROCEDURE — 6370000000 HC RX 637 (ALT 250 FOR IP): Performed by: INTERNAL MEDICINE

## 2022-11-08 PROCEDURE — 2580000003 HC RX 258: Performed by: EMERGENCY MEDICINE

## 2022-11-08 PROCEDURE — 84484 ASSAY OF TROPONIN QUANT: CPT

## 2022-11-08 PROCEDURE — 36415 COLL VENOUS BLD VENIPUNCTURE: CPT

## 2022-11-08 RX ORDER — METOPROLOL TARTRATE 50 MG/1
50 TABLET, FILM COATED ORAL 2 TIMES DAILY
Qty: 60 TABLET | Refills: 3 | Status: SHIPPED | OUTPATIENT
Start: 2022-11-08

## 2022-11-08 RX ADMIN — Medication 10 ML: at 09:34

## 2022-11-08 RX ADMIN — METOPROLOL TARTRATE 50 MG: 50 TABLET, FILM COATED ORAL at 09:33

## 2022-11-08 NOTE — PROGRESS NOTES
Reviewed discharge paperwork with patient, verbalized understanding of medications and follow up appointments. Tele monitor removed, belongings gathered. Patient left unit with belongings via transport with no incident.

## 2022-11-08 NOTE — PLAN OF CARE
Problem: Discharge Planning  Goal: Discharge to home or other facility with appropriate resources  Outcome: Adequate for Discharge  Flowsheets (Taken 11/8/2022 0800)  Discharge to home or other facility with appropriate resources:   Identify barriers to discharge with patient and caregiver   Arrange for needed discharge resources and transportation as appropriate   Identify discharge learning needs (meds, wound care, etc)

## 2022-11-08 NOTE — DISCHARGE INSTR - COC
Continuity of Care Form    Patient Name: Jayla Robles   :  1960  MRN:  [id number]    Admit date:  2022  Discharge date:  ***    Code Status Order: Full Code   Advance Directives:     Admitting Physician:  Casey Rubin MD  PCP: Jesus Manuel Leo MD    Discharging Nurse: [facility] Unit/Room#: K007/T387-59  Discharging Unit Phone Number: ***    Emergency Contact:   Extended Emergency Contact Information  Primary Emergency Contact: [facility]  Home Phone: [phone]  Mobile Phone: [phone]  Relation: Child  Secondary Emergency Contact: juan diego mckeon  Address: [address] [address] Phone: [phone]  Mobile Phone: [phone]  Relation: Spouse  Preferred language: English    Past Surgical History:  Past Surgical History:   Procedure Laterality Date    CARPAL TUNNEL RELEASE      COLONOSCOPY N/A 2022    COLORECTAL CANCER SCREENING, NOT HIGH RISK performed by Araceli Edwards MD at [address] [place] [address] 2022    EGD DIAGNOSTIC ONLY performed by Araceli Edwards MD at [facility]       Immunization History: There is no immunization history for the selected administration types on file for this patient.     Active Problems:  Patient Active Problem List   Diagnosis Code    A-fib ([facility] I48.91       Isolation/Infection:   Isolation            No Isolation          Patient Infection Status       None to display            Nurse Assessment:  Last Vital Signs: /69   Pulse 81   Temp 97.9 °F (36.6 °C)   Resp 18   Ht 5' 3\" (1.6 m)   Wt (!) 306 lb (138.8 kg)   LMP 2015 (Within Months)   SpO2 98%   BMI 54.21 kg/m²     Last documented pain score (0-10 scale):    Last Weight:   Wt Readings from Last 1 Encounters:   22 (!) 306 lb (138.8 kg)     Mental Status:  {IP PT MENTAL STATUS:}    IV Access:  [facility] IV ACCESS:595842018}    Nursing Mobility/ADLs:  Walking   {CHP DME YBCX:662845437}  Transfer  {CHP DME WBMR:630068084}  Bathing  {CHP DME JXFK:808950009}  Dressing  {CHP DME JCKP:266563629}  Toileting  {CHP DME SOOA:287456643}  Feeding  {CHP DME MAGDALENA:952831614}  Med Admin  {CHP DME AJUL:841703045}  Med Delivery   {[facility] MED Delivery:705206970}    Wound Care Documentation and Therapy:        Elimination:  Continence: Bowel: {YES / ZS:01284}  Bladder: {YES / ZZ:26816}  Urinary Catheter: {Urinary Catheter:386323486}   Colostomy/Ileostomy/Ileal Conduit: {YES / TP:65145}       Date of Last BM: ***  No intake or output data in the 24 hours ending 22 0940  No intake/output data recorded.     Safety Concerns:     [address] Safety Concerns:913161164}    Impairments/Disabilities:      [address] Impairments/Disabilities:502554094}    Nutrition Therapy:  Current Nutrition Therapy:   [address] Diet List:456350226}    Routes of Feeding: {[facility] DME Other Feedings:734976902}  Liquids: {Slp liquid thickness:56565}  Daily Fluid Restriction: {CHP DME Yes amt example:256928126}  Last Modified Barium Swallow with Video (Video Swallowing Test): {Done Not Done FCMR:649311320}    Treatments at the Time of Hospital Discharge:   Respiratory Treatments: ***  Oxygen Therapy:  {Therapy; copd oxygen:93813}  Ventilator:    {[facility] Vent CKAW:059531498}    Rehab Therapies: {THERAPEUTIC INTERVENTION:9295620990}  Weight Bearing Status/Restrictions: [employer] Weight Bearin}  Other Medical Equipment (for information only, NOT a DME order):  {EQUIPMENT:821574250}  Other Treatments: ***    Patient's personal belongings (please select all that are sent with patient):  {[facility] DME Belongings:015964616}    RN SIGNATURE:  {Esignature:729720054}    CASE MANAGEMENT/SOCIAL WORK SECTION    Inpatient Status Date: ***    Readmission Risk Assessment Score:  Readmission Risk              Risk of Unplanned Readmission:  8           Discharging to Facility/ Agency   Name: Address:  Phone:  Fax:    Dialysis Facility (if applicable)   Name:  Address:  Dialysis Schedule:  Phone:  Fax:    / signature: {Esignature:768818324}    PHYSICIAN SECTION    Prognosis: {Prognosis:9381403390}    Condition at Discharge: Marylu Montes Patient Condition:256848292}    Rehab Potential (if transferring to Rehab): {Prognosis:2121485441}    Recommended Labs or Other Treatments After Discharge: ***    Physician Certification: I certify the above information and transfer of Lady Mayes  is necessary for the continuing treatment of the diagnosis listed and that she requires {Admit to Appropriate Level of Care:41819} for {GREATER/LESS:576376456} 30 days.      Update Admission H&P: {CHP DME Changes in FUNQD:821109456}    PHYSICIAN SIGNATURE:  {Esignature:800257321}

## 2022-11-08 NOTE — PROGRESS NOTES
This nurse assumed care at 199, patient alert and orientated x4 and responding appropriately to verbal commands. Patient declines any pain or discomfort at this time. Patient has intermittent nonproductive cough present. Patient comfortable in bed, safety precautions in place and call light within reach, will continue to monitor.

## 2022-11-08 NOTE — DISCHARGE INSTR - DIET
Good nutrition is important when healing from an illness, injury, or surgery. Follow any nutrition recommendations given to you during your hospital stay. If you were given an oral nutrition supplement while in the hospital, continue to take this supplement at home. You can take it with meals, in-between meals, and/or before bedtime. These supplements can be purchased at most local grocery stores, pharmacies, and chain [employer]-stores. If you have any questions about your diet or nutrition, call the hospital and ask for the dietitian. Low fat, low salt, heart healthy diet.

## 2022-11-08 NOTE — DISCHARGE SUMMARY
Cardiology Discharge Summary      Patient Identification:  Shirin Dowling  : 1960  MRN: [id number]   Account: [de-identified]     Admit date: 2022  Discharge date: 2022   Attending provider: Leopoldo Pope, MD        Primary care provider: Milena Yin MD     Admission Diagnoses:  [facility])         Discharge Diagnoses: Active Hospital Problems    Diagnosis Date Noted    [facility]) [I48.91] 2022     Priority: Medium          Hospital Course:   Shirin Dowling is a 58 y.o. female admitted to [facility] on 2022 for . Med Rx. Pt converted spontaneously to SR few hrs after Admit. She has had AF in the past. She does get SOB with it. She feels well now. Procedures:        Consults:   None    Examination:  /69   Pulse 81   Temp 97.9 °F (36.6 °C) (Oral)   Resp 18   Ht 5' 3\" (1.6 m)   Wt (!) 306 lb (138.8 kg)   LMP 2015 (Within Months)   SpO2 99%   BMI 54.21 kg/m²    Physical Exam   Constitutional: She appears healthy. No distress. HENT:   Normal cephalic and Atraumatic   Eyes: Pupils are equal, round, and reactive to light. Neck: Thyroid normal. No JVD present. No neck adenopathy. No thyromegaly present. Cardiovascular: Normal rate, regular rhythm, normal heart sounds, intact distal pulses and normal pulses. Pulmonary/Chest: Effort normal and breath sounds normal. She has no wheezes. She has no rales. She exhibits no tenderness. Abdominal: Soft. Bowel sounds are normal. There is no abdominal tenderness. Musculoskeletal:         General: No tenderness or edema. Normal range of motion. Cervical back: Normal range of motion and neck supple. Neurological: She is alert and oriented to person, place, and time. Skin: Skin is warm. No cyanosis. Nails show no clubbing.      Medications:  Current Discharge Medication List        CONTINUE these medications which have NOT CHANGED Details   pantoprazole (PROTONIX) 40 MG tablet Take 1 tablet by mouth every morning (before breakfast)  Qty: 90 tablet, Refills: 0    Associated Diagnoses: Gastroesophageal reflux disease without esophagitis      lisinopril (PRINIVIL;ZESTRIL) 10 MG tablet Take 1 tablet by mouth daily  Qty: 90 tablet, Refills: 1    Associated Diagnoses: Hypertension, unspecified type      acetaminophen (TYLENOL) 500 MG tablet Take 1,000 mg by mouth every 6 hours as needed for Pain      Multiple Vitamins-Minerals (ONE-A-DAY WOMENS 50+ ADVANTAGE PO) Take by mouth             Significant Diagnostics:   Radiology: XR CHEST PORTABLE    Result Date: 11/7/2022  EXAMINATION: ONE XRAY VIEW OF THE CHEST 11/7/2022 12:27 pm COMPARISON: None. HISTORY: ORDERING SYSTEM PROVIDED HISTORY: afib TECHNOLOGIST PROVIDED HISTORY: Reason for exam:->afib What reading provider will be dictating this exam?->CRC FINDINGS: The lungs are without acute focal process. There is no effusion or pneumothorax. The cardiomediastinal silhouette is without acute process. The osseous structures are without acute process. No acute process.        Labs:   Recent Results (from the past 72 hour(s))   CMP    Collection Time: 11/07/22 11:45 AM   Result Value Ref Range    Sodium 137 135 - 144 mEq/L    Potassium 4.0 3.4 - 4.9 mEq/L    Chloride 105 95 - 107 mEq/L    CO2 22 20 - 31 mEq/L    Anion Gap 10 9 - 15 mEq/L    Glucose 108 (H) 70 - 99 mg/dL    BUN 17 8 - 23 mg/dL    Creatinine 0.63 0.50 - 0.90 mg/dL    Est, Glom Filt Rate >60.0 >60    Calcium 9.6 8.5 - 9.9 mg/dL    Total Protein 7.1 6.3 - 8.0 g/dL    Albumin 3.7 3.5 - 4.6 g/dL    Total Bilirubin <0.2 0.2 - 0.7 mg/dL    Alkaline Phosphatase 79 40 - 130 U/L    ALT 31 0 - 33 U/L    AST 32 0 - 35 U/L    Globulin 3.4 2.3 - 3.5 g/dL   CBC with Auto Differential    Collection Time: 11/07/22 11:45 AM   Result Value Ref Range    WBC 8.7 4.8 - 10.8 K/uL    RBC 4.71 4.20 - 5.40 M/uL    Hemoglobin 14.3 12.0 - 16.0 g/dL Hematocrit 42.3 37.0 - 47.0 %    MCV 89.8 79.4 - 94.8 fL    MCH 30.3 27.0 - 31.3 pg    MCHC 33.8 33.0 - 37.0 %    RDW 14.7 (H) 11.5 - 14.5 %    Platelets 915 560 - 634 K/uL    Neutrophils % 49.3 %    Lymphocytes % 32.3 %    Monocytes % 10.3 %    Eosinophils % 7.2 %    Basophils % 0.9 %    Neutrophils Absolute 4.3 1.4 - 6.5 K/uL    Lymphocytes Absolute 2.8 1.0 - 4.8 K/uL    Monocytes Absolute 0.9 (H) 0.2 - 0.8 K/uL    Eosinophils Absolute 0.6 0.0 - 0.7 K/uL    Basophils Absolute 0.1 0.0 - 0.2 K/uL   Troponin    Collection Time: 11/07/22 11:45 AM   Result Value Ref Range    Troponin <0.010 0.000 - 0.010 ng/mL   Magnesium    Collection Time: 11/07/22 11:45 AM   Result Value Ref Range    Magnesium 2.2 1.7 - 2.4 mg/dL   Protime-INR    Collection Time: 11/07/22 11:45 AM   Result Value Ref Range    Protime 12.6 12.3 - 14.9 sec    INR 0.9    TSH with Reflex    Collection Time: 11/07/22 11:45 AM   Result Value Ref Range    TSH 3.290 0.440 - 3.860 uIU/mL   Troponin    Collection Time: 11/08/22 12:00 AM   Result Value Ref Range    Troponin <0.010 0.000 - 0.010 ng/mL   Troponin    Collection Time: 11/08/22  4:41 AM   Result Value Ref Range    Troponin <0.010 0.000 - 0.010 ng/mL   EKG 12 Lead    Collection Time: 11/08/22  5:40 AM   Result Value Ref Range    Ventricular Rate 75 BPM    Atrial Rate 75 BPM    P-R Interval 168 ms    QRS Duration 102 ms    Q-T Interval 396 ms    QTc Calculation (Bazett) 442 ms    P Axis 54 degrees    R Axis -27 degrees    T Axis 3 degrees           Follow-up visits:   Sintia King MD  [facility] [address]  [address]  [phone]    Schedule an appointment as soon as possible for a visit in 2 week(s)         [place] Problems    Diagnosis Date Noted    A-fib [facility]) [I48.91] 11/07/2022     Priority: Medium      PAF- rate control. Change to Xarelto. Continue BB. Hold Lisinopril 10 mg qd due to borderline BP. Will resume as out pt.     Serial Trops - negative Telemetry - SR today  HTN Stable.  Hold ACEI               Electronically signed by Venkata Fenton MD on 11/8/2022 at 9:30 AM

## 2022-11-08 NOTE — PROGRESS NOTES
AM assessment completed, documentation in flowsheet. Lungs clear upon auscultation, respirations unlabored on RA. Monica Alaniz denies pain or discomfort at this time. Call light in reach.

## 2022-11-08 NOTE — PROGRESS NOTES
Removed iv, tip intact, no redness or swelling at site. DCD applied, patient tolerated well. Call light in reach.

## 2022-11-10 LAB
EKG ATRIAL RATE: 115 BPM
EKG Q-T INTERVAL: 258 MS
EKG QRS DURATION: 96 MS
EKG QTC CALCULATION (BAZETT): 402 MS
EKG R AXIS: -31 DEGREES
EKG T AXIS: 49 DEGREES
EKG VENTRICULAR RATE: 146 BPM

## 2022-11-11 ENCOUNTER — OFFICE VISIT (OUTPATIENT)
Dept: FAMILY MEDICINE CLINIC | Age: 62
End: 2022-11-11
Payer: COMMERCIAL

## 2022-11-11 VITALS
RESPIRATION RATE: 14 BRPM | DIASTOLIC BLOOD PRESSURE: 82 MMHG | HEART RATE: 72 BPM | WEIGHT: 293 LBS | TEMPERATURE: 97.9 F | HEIGHT: 63 IN | OXYGEN SATURATION: 99 % | SYSTOLIC BLOOD PRESSURE: 120 MMHG | BODY MASS INDEX: 51.91 KG/M2

## 2022-11-11 DIAGNOSIS — I10 HYPERTENSION, UNSPECIFIED TYPE: ICD-10-CM

## 2022-11-11 DIAGNOSIS — I48.91 NEW ONSET ATRIAL FIBRILLATION (HCC): ICD-10-CM

## 2022-11-11 DIAGNOSIS — Z09 HOSPITAL DISCHARGE FOLLOW-UP: Primary | ICD-10-CM

## 2022-11-11 DIAGNOSIS — E66.01 CLASS 3 SEVERE OBESITY WITH SERIOUS COMORBIDITY AND BODY MASS INDEX (BMI) OF 50.0 TO 59.9 IN ADULT, UNSPECIFIED OBESITY TYPE (HCC): ICD-10-CM

## 2022-11-11 PROCEDURE — 3078F DIAST BP <80 MM HG: CPT | Performed by: FAMILY MEDICINE

## 2022-11-11 PROCEDURE — 3074F SYST BP LT 130 MM HG: CPT | Performed by: FAMILY MEDICINE

## 2022-11-11 PROCEDURE — 99214 OFFICE O/P EST MOD 30 MIN: CPT | Performed by: FAMILY MEDICINE

## 2022-11-11 ASSESSMENT — ENCOUNTER SYMPTOMS
VOMITING: 0
DIARRHEA: 0

## 2022-11-11 NOTE — PROGRESS NOTES
Subjective:      Patient ID: Miguel Angel Hall is a 58 y.o. female    Heart Problem  This is a new problem. The current episode started in the past 7 days. Pertinent negatives include no chest pain, rash or vomiting. Hypertension  Pertinent negatives include no chest pain. Here in follow up from recent hospital stay for a fib with rvr. Did spontaneously convert to nsr while in hospital.  Cardiology later this month   feels much better overall. No chest pain-breathing easy    Review of Systems   Cardiovascular:  Negative for chest pain. Gastrointestinal:  Negative for diarrhea and vomiting. Skin:  Negative for rash. Neurological:  Negative for dizziness. Reviewed allergy, medical, social, surgical, family and med list changes and updated   Files     Social History     Socioeconomic History    Marital status:      Spouse name: None    Number of children: None    Years of education: None    Highest education level: None   Tobacco Use    Smoking status: Former     Packs/day: 1.00     Years: 7.00     Pack years: 7.00     Types: Cigarettes     Quit date: 1984     Years since quittin.8    Smokeless tobacco: Never   Vaping Use    Vaping Use: Never used   Substance and Sexual Activity    Alcohol use: Not Currently     Comment: very occationally    Drug use: Never    Sexual activity: Yes     Partners: Male     Social Determinants of Health     Financial Resource Strain: Low Risk     Difficulty of Paying Living Expenses: Not hard at all   Food Insecurity: No Food Insecurity    Worried About Running Out of Food in the Last Year: Never true    Ran Out of Food in the Last Year: Never true   Transportation Needs: No Transportation Needs    Lack of Transportation (Medical): No    Lack of Transportation (Non-Medical):  No     Current Outpatient Medications   Medication Sig Dispense Refill    metoprolol tartrate (LOPRESSOR) 50 MG tablet Take 1 tablet by mouth 2 times daily 60 tablet 3    rivaroxaban (XARELTO) 20 MG TABS tablet Take 1 tablet by mouth daily 30 tablet 5    pantoprazole (PROTONIX) 40 MG tablet Take 1 tablet by mouth every morning (before breakfast) 90 tablet 0    acetaminophen (TYLENOL) 500 MG tablet Take 1,000 mg by mouth every 6 hours as needed for Pain      Multiple Vitamins-Minerals (ONE-A-DAY WOMENS 50+ ADVANTAGE PO) Take by mouth       No current facility-administered medications for this visit. Family History   Problem Relation Age of Onset    Heart Attack Paternal Grandmother     Stroke Maternal Grandmother     Cancer Maternal Grandmother     Heart Disease Father     Hypertension Father     Diabetes Mother     Heart Disease Mother     Liver Cancer Brother     Diabetes Sister     Heart Defect Sister     Colon Cancer Neg Hx      Past Medical History:   Diagnosis Date    Hypertension     Obesity      Objective:   /82   Pulse 72   Temp 97.9 °F (36.6 °C)   Resp 14   Ht 5' 3\" (1.6 m)   Wt (!) 306 lb (138.8 kg)   LMP 01/01/2015 (Within Months)   SpO2 99%   BMI 54.21 kg/m²     Physical Exam    Lungs:clear and equal breath sounds. No wheezes or rales. Heart:rate reg. No murmur. No gallops     Extremities:no edema in either leg  Gen: In no acute distress    Assessment:          Diagnosis Orders   1. Hospital discharge follow-up        2. New onset atrial fibrillation ([address]        3. Hypertension, unspecified type        4.  Class 3 severe obesity with serious comorbidity and body mass index (BMI) of 50.0 to 59.9 in adult, unspecified obesity type [facility])  [address]:    Continue current medications   F/u with cardiology as already planned   F/u in 6 months after fasting blood work   Fasting blood work in 6 months and f/u after done

## 2022-11-22 ENCOUNTER — OFFICE VISIT (OUTPATIENT)
Dept: CARDIOLOGY CLINIC | Age: 62
End: 2022-11-22
Payer: COMMERCIAL

## 2022-11-22 VITALS
WEIGHT: 293 LBS | SYSTOLIC BLOOD PRESSURE: 118 MMHG | OXYGEN SATURATION: 99 % | HEART RATE: 71 BPM | DIASTOLIC BLOOD PRESSURE: 68 MMHG | BODY MASS INDEX: 53.78 KG/M2

## 2022-11-22 DIAGNOSIS — I48.91 ATRIAL FIBRILLATION, UNSPECIFIED TYPE (HCC): ICD-10-CM

## 2022-11-22 DIAGNOSIS — I10 HYPERTENSION, UNSPECIFIED TYPE: ICD-10-CM

## 2022-11-22 DIAGNOSIS — I10 HYPERTENSION, UNSPECIFIED TYPE: Primary | ICD-10-CM

## 2022-11-22 DIAGNOSIS — R06.09 DOE (DYSPNEA ON EXERTION): Primary | ICD-10-CM

## 2022-11-22 DIAGNOSIS — G47.33 OSA (OBSTRUCTIVE SLEEP APNEA): ICD-10-CM

## 2022-11-22 DIAGNOSIS — R94.31 ABNORMAL ECG: ICD-10-CM

## 2022-11-22 PROCEDURE — 3074F SYST BP LT 130 MM HG: CPT | Performed by: INTERNAL MEDICINE

## 2022-11-22 PROCEDURE — 99214 OFFICE O/P EST MOD 30 MIN: CPT | Performed by: INTERNAL MEDICINE

## 2022-11-22 PROCEDURE — 3078F DIAST BP <80 MM HG: CPT | Performed by: INTERNAL MEDICINE

## 2022-11-22 ASSESSMENT — ENCOUNTER SYMPTOMS
GASTROINTESTINAL NEGATIVE: 1
WHEEZING: 0
NAUSEA: 0
COUGH: 0
BLOOD IN STOOL: 0
SHORTNESS OF BREATH: 0
EYES NEGATIVE: 1
RESPIRATORY NEGATIVE: 1
STRIDOR: 0
CHEST TIGHTNESS: 0

## 2022-11-22 NOTE — PROGRESS NOTES
Subsequent Progress Note  Patient: Leoncio Dowd  YOB: 1960  MRN: [id number]    Chief Complaint: AF  Chief Complaint   Patient presents with    Follow-Up from [address] [place] [address] [place]: -       CV Data:   Echo EF 50%  Subjective/HPI: recent hosp for AF. She was able to sense AF. Had these in the past as well TSK and lytes WNL. Pt did have DELGADO.       EKG:    Former smoker  Occ etoh  Lives w   +FH  retired    Past Medical History:   Diagnosis Date    Hypertension     Obesity        Past Surgical History:   Procedure Laterality Date    CARPAL TUNNEL RELEASE      COLONOSCOPY N/A 2022    COLORECTAL CANCER SCREENING, NOT HIGH RISK performed by Tom Herrera MD at [address] N/A 2022    EGD DIAGNOSTIC ONLY performed by Tom Herrera MD at [facility]       Family History   Problem Relation Age of Onset    Heart Attack Paternal Grandmother     Stroke Maternal Grandmother     Cancer Maternal Grandmother     Heart Disease Father     Hypertension Father     Diabetes Mother     Heart Disease Mother     Liver Cancer Brother     Diabetes Sister     Heart Defect Sister     Colon Cancer Neg Hx        Social History     Socioeconomic History    Marital status:    Tobacco Use    Smoking status: Former     Packs/day: 1.00     Years: 7.00     Pack years: 7.00     Types: Cigarettes     Quit date: 1984     Years since quittin.9    Smokeless tobacco: Never   Vaping Use    Vaping Use: Never used   Substance and Sexual Activity    Alcohol use: Not Currently     Comment: very occationally    Drug use: Never    Sexual activity: Yes     Partners: Male     Social Determinants of Health     Financial Resource Strain: Low Risk     Difficulty of Paying Living Expenses: Not hard at all   Food Insecurity: No Food Insecurity    Worried About Running Out of Food in the Last Year: Never true    [address] in the Last Year: Never true   Transportation Needs: No Transportation Needs    Lack of Transportation (Medical): No    Lack of Transportation (Non-Medical): No       No Known Allergies    Current Outpatient Medications   Medication Sig Dispense Refill    metoprolol tartrate (LOPRESSOR) 50 MG tablet Take 1 tablet by mouth 2 times daily 60 tablet 3    rivaroxaban (XARELTO) 20 MG TABS tablet Take 1 tablet by mouth daily 30 tablet 5    pantoprazole (PROTONIX) 40 MG tablet Take 1 tablet by mouth every morning (before breakfast) 90 tablet 0    acetaminophen (TYLENOL) 500 MG tablet Take 1,000 mg by mouth every 6 hours as needed for Pain      Multiple Vitamins-Minerals (ONE-A-DAY WOMENS 50+ ADVANTAGE PO) Take by mouth       No current facility-administered medications for this visit. Review of Systems:   Review of Systems   Constitutional: Negative. Negative for diaphoresis and fatigue. HENT: Negative. Eyes: Negative. Respiratory: Negative. Negative for cough, chest tightness, shortness of breath, wheezing and stridor. Cardiovascular: Negative. Negative for chest pain, palpitations and leg swelling. Gastrointestinal: Negative. Negative for blood in stool and nausea. Genitourinary: Negative. Musculoskeletal: Negative. Skin: Negative. Neurological: Negative. Negative for dizziness, syncope, weakness and light-headedness. Hematological: Negative. Psychiatric/Behavioral: Negative. Physical Examination:    /68 (Site: Right Lower Arm, Position: Sitting, Cuff Size: Medium Adult)   Pulse 71   Wt (!) 303 lb 9.6 oz (137.7 kg)   LMP 01/01/2015 (Within Months)   SpO2 99%   BMI 53.78 kg/m²    Physical Exam   Constitutional: She appears healthy. No distress. HENT:   Normal cephalic and Atraumatic   Eyes: Pupils are equal, round, and reactive to light. Neck: Thyroid normal. No JVD present. No neck adenopathy. No thyromegaly present.    Cardiovascular: Normal rate, regular rhythm, normal heart sounds, intact distal pulses and normal pulses. Pulmonary/Chest: Effort normal and breath sounds normal. She has no wheezes. She has no rales. She exhibits no tenderness. Abdominal: Soft. Bowel sounds are normal. There is no abdominal tenderness. Musculoskeletal:         General: No tenderness or edema. Normal range of motion. Cervical back: Normal range of motion and neck supple. Neurological: She is alert and oriented to person, place, and time. Skin: Skin is warm. No cyanosis. Nails show no clubbing.      LABS:  CBC:   Lab Results   Component Value Date/Time    WBC 8.7 11/07/2022 11:45 AM    RBC 4.71 11/07/2022 11:45 AM    HGB 14.3 11/07/2022 11:45 AM    HCT 42.3 11/07/2022 11:45 AM    MCV 89.8 11/07/2022 11:45 AM    MCH 30.3 11/07/2022 11:45 AM    MCHC 33.8 11/07/2022 11:45 AM    RDW 14.7 11/07/2022 11:45 AM     11/07/2022 11:45 AM     Lipids:  Lab Results   Component Value Date    CHOL 191 05/17/2022    CHOL 183 09/24/2020    CHOL 202 (H) 09/13/2019     Lab Results   Component Value Date    TRIG 102 05/17/2022    TRIG 162 (H) 09/24/2020    TRIG 125 09/13/2019     Lab Results   Component Value Date    HDL 46 05/17/2022    HDL 44 09/24/2020    HDL 53 09/13/2019     Lab Results   Component Value Date    LDLCALC 125 05/17/2022    LDLCALC 107 09/24/2020    LDLCALC 124 09/13/2019     No results found for: LABVLDL, VLDL  No results found for: CHOLHDLRATIO  CMP:    Lab Results   Component Value Date/Time     11/07/2022 11:45 AM    K 4.0 11/07/2022 11:45 AM     11/07/2022 11:45 AM    CO2 22 11/07/2022 11:45 AM    BUN 17 11/07/2022 11:45 AM    CREATININE 0.63 11/07/2022 11:45 AM    GFRAA >60.0 05/17/2022 09:04 AM    LABGLOM >60.0 11/07/2022 11:45 AM    GLUCOSE 108 11/07/2022 11:45 AM    PROT 7.1 11/07/2022 11:45 AM    LABALBU 3.7 11/07/2022 11:45 AM    CALCIUM 9.6 11/07/2022 11:45 AM    BILITOT <0.2 11/07/2022 11:45 AM    ALKPHOS 79 11/07/2022 11:45 AM    AST 32 11/07/2022 11:45 AM    ALT 31 11/07/2022 11:45 AM     BMP:    Lab Results   Component Value Date/Time     11/07/2022 11:45 AM    K 4.0 11/07/2022 11:45 AM     11/07/2022 11:45 AM    CO2 22 11/07/2022 11:45 AM    BUN 17 11/07/2022 11:45 AM    LABALBU 3.7 11/07/2022 11:45 AM    CREATININE 0.63 11/07/2022 11:45 AM    CALCIUM 9.6 11/07/2022 11:45 AM    GFRAA >60.0 05/17/2022 09:04 AM    LABGLOM >60.0 11/07/2022 11:45 AM    GLUCOSE 108 11/07/2022 11:45 AM     Magnesium:    Lab Results   Component Value Date/Time    MG 2.2 11/07/2022 11:45 AM     TSH:No results found for: TSHFT4, TSH    Patient Active Problem List   Diagnosis    A-fib ([facility]       There are no discontinued medications. Modified Medications    No medications on file       No orders of the defined types were placed in this encounter. Assessment/Plan:    1. DELGADO (dyspnea on exertion)     - NM MYOCARDIAL SPECT REST EXERCISE OR RX; Future    2. Atrial fibrillation, unspecified type ([address]   DOAC and rate control   - NM MYOCARDIAL SPECT REST EXERCISE OR RX; Future    3. Hypertension, unspecified type   Stable conitnue meds. Low salt diet     4. Abnormal ECG     - NM MYOCARDIAL SPECT REST EXERCISE OR RX; Future    5. MADDY (obstructive sleep apnea)     - Baseline Diagnostic Sleep Study; Future     Counseling:  Heart Healthy Lifestyle, Improve BMI, Low Salt Diet, Take Precautions to Prevent Falls, and Walk Daily    Return for AFTER TESTS.       Electronically signed by Clifford Staples MD on 11/22/2022 at 1:13 PM

## 2022-12-22 NOTE — TELEPHONE ENCOUNTER
Per [employer] from [address] test was done, correct order needs signed or reordered Post Acute Skilled Nursing Home [employer] Visit Note     Date of Service: 12/22/2022  Location seen at: [facility] MEDICAL [facility]: [facility]    Subacute / Skilled Need: Rehabilitation, Wound Care and IV Antibiotic Therapy    PCP:Yesenia Woo MD  Patient Care Team:  Yesenia Woo MD as PCP - General  Ashley Magaña MD (Hematology & Oncology)  Daphne Benitez, RN as Registered Nurse (Registered Nurse)  Lopez Pratt RN as Care Transitions Nurse (Registered Nurse)  Prasanna Newman MD as Post Acute Facility Provider: Physician (Geriatric Medicine)  Melany Payne CNP as Post Acute Facility Provider: APC (Nurse Practitioner- Acute Care)  Seen by Melany Payne CNP 12/22/2022    Hospital: [facility]: 10/18-11/11  [facility] acute rehab: 11/11-11/18  Inpatient [facility]: 11/18-12/2  [facility]: 12/2    Maria Elena Gayle is a 70 year old female presenting to Post Acute Skilled Nursing for:   Chief Complaint   Patient presents with   • Skilled Nursing Home Visit   • Weakness   • Pain     History of Present Illness: Per Chart Review:   [facility]: 10/18-11/14  69 year old female with a past medical history significant for stage IV diffuse large B-cell lymphoma (s/p 6 cycles of R-CHOP), insulin-dependent diabetes mellitus, hypothyroidism, hyperlipidemia, and hypertension who presented for scheduled cycle 3 of chemotherapy R-ICE. Patient initially tolerated the therapy well, however, spiked a fever. Patient has hx of recurrent bacteremia with two previous incidents of E.coli bactermia. Full neutropenic workup was sent and patient blood cx were positive for E.coli. ID services were consulted and recommended given persistent hx of bactermiea to have an echo done for possible vegetation. Echo did not show any vegetation and source of infection was thought to be from patien's port. Port was removed and patient was started on appropriate antibiotics. Throughout her course, patient had periords of worsening mental status, diffuse/vague abdominal pain. Patient had  several imaging modalities done ranging from MRI/CT of brain and CT CAP which were all ultimately negative for any acute processes. Patient lethragy was thought to be multifactorial, but likely due to cefepime induced neurotoxcity and some component of opiod medication. Cefepime was changed to meropenem and all opoid medications were held. Patient mental status improved over the course of few days. Patient also had worsening renal function throughout this hospitalzation. Nephrology was consulted and treated with proper fluid/electrolyte management. Etiology of TOM likely due to ifosfamide toxcity and patient likely to have some baseline CKD after this hospitalization. Throhougt the course, patient extremly deconditioned and physically weak. PMNR services were consulted and patient was deemed appropriate for acute inpatient rehab. Patient improved clinically and once was medically cleared was sent to  for inpatient rehabillation.     Acute Rehab: 11/11-11/18  While at acute rehab patient complained of back pain and had MRI of L-spine on 11/17 noted L3-L4 regional abnormality and concerns of discitis/osteomyelitis with an epidural inflammatory component.  Infectious disease was consulted and patient was sent back to inpatient for further management and rehab.    Inpatient [facility]: 11/18-12/2  Patient was seen by infectious disease and started on ceftriaxone for 6-week course to complete on 12/30.  Patient did complete amoxicillin for her UTI.  Patient did have diarrhea and C. difficile was negative however remained n.p.o. vancomycin for prophylaxis x-ray since.  Patient did have TOM seen by nephrology and remains on bicarb and K citrate tabs.  Creatinine improved to 1.3 at the time of discharge.  Patient otherwise remained stable and was sent to [facility] for further management and rehab.    PROBLEM LIST:  1. RSV (respiratory syncytial virus infection)    2. Diffuse large B-cell lymphoma, unspecified body region  (CMS/[facility])    3. Frailty    4. Acute kidney injury (CMS/[facility])    5. Osteomyelitis of lumbar spine (CMS/[facility])    6. Hyperlipidemia, unspecified hyperlipidemia type    7. Chronic kidney disease, unspecified CKD stage    8. Toxic metabolic encephalopathy    9. Hypothyroidism, unspecified type    10. Type 2 diabetes mellitus with hyperosmolarity without coma, with long-term current use of insulin (CMS/[facility])    11. Fanconi syndrome (CMS/[facility])    12. Decreased oral intake    13. Impaired mobility and ADLs    14. Chest congestion    15. Viral upper respiratory tract infection      Advance Care Planning Visit  Advance Directives:  discussed and advised the patient to complete one  and full code   GOC: return home/cure  Emergency Contact: soco Gonzáles: [phone]    ALLERGIES:  Allergies as of 12/22/2022 - Reviewed 12/19/2022   Allergen Reaction Noted   • Adhesive   (environmental) HIVES 08/30/2022   • Chlorhexidine RASH and PRURITUS 12/29/2021   • Levaquin RASH 11/10/2021     CURRENT HOME MEDICATIONS:   Current Outpatient Medications   Medication Sig Dispense Refill   • ipratropium-albuterol (DUONEB) 0.5-2.5 (3) MG/3ML nebulizer solution Take 3 mLs by nebulization every 6 hours.     • potassium citrate-citric acid (POLYCITRA) 1100-334 MG/5ML solution Take 30 mLs by mouth in the morning and 30 mLs at noon and 30 mLs in the evening.     • polyethylene glycol (MIRALAX) 17 g packet Take 17 g by mouth daily. Stir and dissolve powder in any 4 to 8 ounces of beverage, then drink.     • famotidine (PEPCID) 20 MG tablet Take 20 mg by mouth daily.     • valACYclovir (VALTREX) 500 MG tablet Take 1 tablet by mouth every 12 hours.     • vancomycin (VANCOCIN) 125 MG capsule Take 1 capsule by mouth every 12 hours.     • midodrine (PROAMATINE) 10 MG tablet Take 1 tablet by mouth 3 times daily (before meals).     • prochlorperazine (COMPAZINE) 5 MG tablet Take 1 tablet by mouth every 6 hours as needed for Nausea or Vomiting.     • sodium  bicarbonate 650 MG tablet Take 2 tablets by mouth in the morning and 2 tablets in the evening.     • dronabinol (MARINOL) 2.5 MG capsule Take 1 capsule by mouth 2 times daily.  0   • lidocaine (LIDOCARE) 4 % patch Place 1 patch onto the skin daily.     • cyclobenzaprine (FLEXERIL) 5 MG tablet Take 1 tablet by mouth 3 times daily as needed for Muscle spasms.     • insulin lispro 100 UNIT/ML sliding scale injection >>>LOW Dose Correction Scale<<<  If Patient is not eating or NPO, continue to GIVE Correction Dose according to scale below:    Blood Sugar less than 150 mg/dL - Give 0 units of Correction Dose  150-199 mg/dL - GIVE 1 unit of Correction Dose  200-249 mg/dL - GIVE 2 units of Correction Dose  250-299 mg/dL - GIVE 3 units of Correction Dose  300-349 mg/dL - GIVE 4 units of Correction Dose  350 mg/dL or greater - GIVE 5 units of Correction Dose and Notify MD  12   • HYDROcodone-acetaminophen (NORCO) 5-325 MG per tablet Take 1 tablet by mouth every 8 hours as needed for Pain. 20 tablet 0   • cefTRIAXone (ROCEPHIN) 1 g injection Inject 2,000 mg into the vein daily. 60 each 1   • acetaminophen (TYLENOL) 500 MG tablet Take 1,000 mg by mouth every 6 hours as needed for Pain.     • atorvastatin (LIPITOR) 40 MG tablet TAKE 1 TABLET BY MOUTH DAILY (Patient taking differently: Take 40 mg by mouth at bedtime.) 90 tablet 1   • Insulin Syringe 29G X 1/2\" 0.3 ML Misc      • Insulin Pen Needle 31G X 5 MM Misc Use to inject insulin 1 times daily. Remove needle cover(s) to expose needle before injecting. 100 each 0   • sitaGLIPtin (JANUVIA) 100 MG tablet Take 0.5 tablets by mouth daily. (Patient taking differently: Take 50 mg by mouth every morning.) 30 tablet 3   • True Metrix Blood Glucose Test test strip 1 strip 4 times daily. Test blood sugar 4 times daily. Diagnosis: E11.9 400 each 1   • TRUEplus Lancets 30G Misc Inject 1 each into the skin 4 times daily. 400 each 1   • levothyroxine 88 MCG tablet Take 1 tablet by mouth  daily (before breakfast). 90 tablet 3   • Ascorbic Acid (VITAMIN C PO) Take 1 tablet by mouth daily after dinner.      • B Complex Vitamins (B COMPLEX PO) Take 1 tablet by mouth daily after dinner.      • Multiple Vitamin (MULTIVITAMIN PO) Take 1 tablet by mouth daily after dinner.      • Cholecalciferol (VITAMIN D3) 2000 units Tab Take 50 mcg by mouth daily after dinner.        No current facility-administered medications for this visit.      reviewed; no aberrant behavior identified, prescription authorized.  Pharmacy to send medication upon DC:[facility]: [facility]  Medication Reconciliation Completed: Yes    BASELINE FUNCTIONAL STATUS:  Patient was living alone in a home with an elevator.  Patient was not using any assistive devices.  Patient was independent prior.  Patient was driving prior.  No oxygen at home.  Has blood glucose level machine at home.    CURRENT FUNCTIONAL STATUS:  Weight bearing as tolerated (WBAT)     12/6: Eval pending    12/8:Walking 8 to 10 feet with a rolling walker contact-guard min assist.  Transfers are min mod.  Memory is mod.  Dressing upper body supervision lower body mod.  Bathing upper body supervision lower body mod.  Toilet transfers mod.  Toilet hygiene max.  Grooming supervision feeding supervision    12/12: walking 8 ft min/mod RW, tx: mod x2, BM: min/mod A, dressing UB: supervision, dressing LB:mod, Bathing UB: supervision,  Bathing LB: mod, Toilet tx: min, toilet hygine; mod, grooming supervision, feeding: supervision.     12/15: Walking 30 feet with a rolling walker min A.  Transfers are mod min.  Mobility is min.  Dressing rate supervision lower body mod.  Bathing upper body supervision lower body mod.  Toilet transfers min contact-guard.  Toilet hygiene mod.  Grooming supervision feeding independent    12/19:Walking 30 feet with rolling walker min A.  Transfers are min A.  Mobility is min A.  Dressing upper rate supervision lower body min.  Bathing upper body  supervision lower body min.  Toilet transfers min.  Toilet hygiene min.  Grooming supervision feeding independent    12/22:20-30 ft walking with RW,TX: min/mod,BM: CGA/Mins A,dressing UB: supervision, Lower body: mod, Bathing UB:supervision, Lower body: mod, toilet tx: min/cga, toilet hygine: min, Grooming: sup, feeding: supervision    DIET:  Consistency: General   Type: regular  Appetite: Less than usual    REVIEW OF SYSTEMS:  See A/P    PHYSICAL ASSESSMENT:  Physical Exam  Vitals reviewed.   HENT:      Right Ear: External ear normal.      Left Ear: External ear normal.      Mouth/Throat:      Mouth: Mucous membranes are moist.      Neck: Normal range of motion.   Eyes:      General:         Right eye: No discharge.         Left eye: No discharge.   Cardiovascular:      Rate and Rhythm: Normal rate.      Pulses: Normal pulses.   Pulmonary:      Effort: Pulmonary effort is normal.      Breath sounds: Rales present.      Comments: Coarse bilateral lower lobes  Abdominal:      General: Bowel sounds are normal. There is no distension.      Palpations: Abdomen is soft.   Musculoskeletal:         General: Normal range of motion.      Right lower leg: No edema.      Left lower leg: No edema.      Comments: Right PICC line clean dry intact   Skin:     General: Skin is dry.      Capillary Refill: Capillary refill takes 2 to 3 seconds.   Neurological:      Mental Status: She is alert. She is disoriented.      Motor: Weakness present.      Gait: Gait abnormal.      Comments: Forgetful   Psychiatric:         Mood and Affect: Mood normal.         Behavior: Behavior normal.         Thought Content: Thought content normal.         Judgment: Judgment normal.       VITALS:  Visit Vitals  /87   Pulse 86   Temp 97.7 °F (36.5 °C)   Resp 18   Ht 5' 5\" (1.651 m)   Wt 73.1 kg (161 lb 1.6 oz)   SpO2 96% Comment: RA   BMI 26.81 kg/m²   12/22: Blood glucose level: 118,122,112  Vitals:    12/22/22 1440   PainSc:  0     LABS:  12/21: NA  144, K4.1, chloride 110, CO2 26, BUN 23, creatinine 2.3, glucose 115, calcium 9.2  WBC 3.5, hemoglobin 7.6, hematocrit 21.9, platelets 47    12/19: Chest x-ray: No acute cardiopulmonary process.    12/19: RSV detected.    12/19: , K2.8, chloride 110, CO2 22, BUN 24, creatinine 2.2, glucose 110, calcium 9.4, magnesium 1.3, phosphorus 3.3, alk phos 130, ALT 9, AST 11  CBC pending    12/16: , K3.5, chloride 110, CO2 24, BUN 23, creatinine 2.3, glucose 91, calcium 9.1  WBC 4.5, hemoglobin 8.2, hematocrit 25.3, platelets 70    12/14: , K4.6, chloride 109, CO2 27, BUN 26, creatinine 2.6, glucose 96, calcium 9.8  WBC 4.3, hemoglobin 6.5, hematocrit 19.8, platelets 71    12/12:Na 143, K 4.4, Cl 110, Co2 24, BUN 23, Cr 2.0, Glu 123  WBC 4.1, Hgb 7.5 HCT 21.2, plts 74    12/9: , K 4.5, Cl 114, Co2: 20, BUN 15, Cr 2.0, Glu 89    12/8: , K3.4, chloride 114, CO2 19, BUN 15, creatinine 2.1, glucose 96, calcium 9.4, alk phos 108, ALT 8, AST 14    12/6: , K3.8, chloride 114, CO2 18, BUN 12, creatinine 2.0, glucose 91, calcium 9.5    12/5: , K3.1, chloride 110, CO2 20, BUN 12, creatinine 1.9, glucose 91, calcium 9.0, magnesium 1.6, phosphorus 2.9, alk phos 98, ALT 11, AST 16  WBC 5.5, hemoglobin 8.0, hematocrit 22.9, platelets 77    TSH (mcUnits/mL)   Date Value   08/31/2022 0.365     Hemoglobin A1C (%)   Date Value   08/25/2022 6.6 (H)     ASSESSMENT AND PLAN  Assessment   No problem-specific Assessment & Plan notes found for this encounter.  12/22: Patient seen and examined resting in bed.  Patient remains on room air without any shortness of breath.  Patient does continue to have coughing with minimal sputum production and per patient it is much better than it was earlier in the week.  Patient denies any shortness of breath or dyspnea exertion at this time.  Patient denies any nausea, vomiting, chest pain, fever or chills at this time.  Patient does take antinausea medication however  still not able to tolerate much p.o. and on IVF.  Pain management with ongoing narcotics and with relief of her back pain.  Discussed with patient regarding ID recommendations to discontinue to biotics on 12/30 and remove midline thereafter and patient will be discharged home the next day.  Patient did have a video visit with hematology and understands to continue current management and follow-up outpatient.  Discussed with patient regarding repeat labs tomorrow and if hemoglobin is below 7 will need to go to [facility] for blood transfusion.    RSV Positive: URI complaints: Fatigue, chest congestion, productive cough  Remains on room air without any fevers  DuoNeb scheduled  Chest x-ray without cardiopulm process  Incentive spirometer  Labs reviewed with heme via PS on 12/21 and repeat on 12/23    Hypokalemia  Potassium increased to 3 times daily  Repeat labs- stable    Back pain secondary to L3-L4 discitis/osteomyelitis  Recurrent E. coli bacteremia likely secondary to infected port status post removal of port  Status post amoxicillin for VRE UTI.  DC isolation per ID  Ceftriaxone to complete 6-week course through 12/30.  Follow-up with ID thereafter  Continue prophylaxis Valtrex  Continue vancomycin p.o. while on broad-spectrum antibiotics: 12/30  Pain management with Norco, Flexeril and Tylenol and Lidoderm patch and heating pad  Avoid morphine due to renal insufficiency  12/22: Per the office prescription faxed to discontinue antibiotics after last dose on 12/30 and PICC may be removed and patient will follow-up with ID sometime in January.    Diffuse large B-cell lymphoma status posttreatment with RICE   PICC placement on 11/23  Continue supplement transfusion as needed to maintain hemoglobin greater than 7 and platelets greater than 10  Follow-up with oncology outpatient  Status post 1 PRBC on 12/16  Hemoglobin 7.5 on 12/21.  Repeat labs on 12/23    TOM on CKD:Likely secondary to ifosfamide nephrotoxicity + sepsis    Hyperchloremic acidosis in setting of Fanconi syndrome  Hypomagnesemia/hypophosphatemia  Sodium bicarb and K citrate per nephrology  Midodrine 10 mg 3 times daily  Blood/22: Remains on IV fluids x1 L.  Repeat labs tomorrow push p.o.    History of metabolic encephalopathy: Mentation waxing and waning  Limit narcotics/sedatives  Avoid cefepime  Discontinue Dilaudid and morphine    Left neck wound: Healed at this time no open areas noted    Type 2 DM without long term use  Hemoglobin A1C (%)   Date Value   08/25/2022 6.6 (H)   Januvia and Lispro sliding scale with meals    Hyperlipidemia  Statin    Hypothyroidism  TSH (mcUnits/mL)   Date Value   08/31/2022 0.365   Levothyroxine    Poor p.o. intake: Nausea and insufficient taste complaint surgery reason chemo  On Marinol/Compazine as needed  Dietitian to see patient at SNF    GI prophylaxis:pepcid while at SNF    DVT prophylaxis: Thrombocytopenia, anemia: SCDs will avoid anticoagulation    Debility: PT/OT  See [facility]  Physiatrist to see patient at [facility]    FOLLOW UP APPOINTMENTS:  No future appointments.  Follow-up with PCP after discharge from SNF  Follow-up with oncology outpatient: Discussed with SNF to make an appointment ASAP    DISCHARGE PLANNING: Home with home health PT/OT and RN/possible caregiver/palliative care upon discharge to home: DC on 12/31 after IV antibiotics    Discussed with son regarding IV antibiotics at home and he is not willing to learn at this time.  Per patient she is not able to receive any transportation for daily infusion at the infusion center.  Patient will need to stay at SNF until IV antibiotics are completed.12/30      to follow-up with son regarding disposition.  Per son may need to go to her own home with possible caregiver need.    Discussed with: RN / Nursing, Family, Patient, SW/, PT and OT    Prognosis: fair    Total time spent is more than 30 minutes, with more than 50% of the time spent in  coordination of care, counseling, review of records and discussion of plan of care with the patient /staff /family.    Charting performed using Dragon Dictation and thus may result in grammatical, spelling & documentation inaccuracies.    Disclaimer: Note to patient - The 21st Century Cures Act requires that medical notes like this be available to patients in the interest of transparency. However, be advised this is a medical document. It is intended as peer to peer communication. It is written in medical language and may contain abbreviations or verbiage that are unfamiliar. It may appear blunt or direct. Medical documents are intended to carry relevant information, facts as evident, and the clinical opinion of the practitioner.    Melany Payne, CNP  Signature Date: 12/22/2022

## 2023-01-09 ENCOUNTER — TELEPHONE (OUTPATIENT)
Dept: FAMILY MEDICINE CLINIC | Age: 63
End: 2023-01-09

## 2023-01-09 NOTE — TELEPHONE ENCOUNTER
Please let her know Dr. Stan Cheatham is out this week. Please schedule an appt for her to be evaluated.

## 2023-01-09 NOTE — TELEPHONE ENCOUNTER
Patient called stating that Dr. Nabor Turner has put her on on rivaroxaban 20 mg tab and metoprolol Tartrate 50 mg tab and since then she has been getting a very bad migraine she states that it gets really bad when she is sleeping about 2 am. Patient states that she has called Dr. Nabor Turner office and they told her to call her PCP.  She is wondering what she should do

## 2023-01-10 NOTE — TELEPHONE ENCOUNTER
[facility] to schedule appointment to get evaluated for migraines and medication problems with side effects. Did notify patient Anitha Diaz is out of office this week.

## 2023-02-24 RX ORDER — METOPROLOL TARTRATE 50 MG/1
50 TABLET, FILM COATED ORAL 2 TIMES DAILY
Qty: 60 TABLET | Refills: 5 | Status: SHIPPED | OUTPATIENT
Start: 2023-02-24

## 2023-02-24 NOTE — TELEPHONE ENCOUNTER
Requesting medication refill.  Please approve or deny this request.    Rx requested:  Requested Prescriptions     Pending Prescriptions Disp Refills    metoprolol tartrate (LOPRESSOR) 50 MG tablet 60 tablet 5     Sig: Take 1 tablet by mouth 2 times daily    rivaroxaban (XARELTO) 20 MG TABS tablet 30 tablet 5     Sig: Take 1 tablet by mouth daily         Last Office Visit:   11/22/2022      Next Visit Date:  Future Appointments   Date Time Provider Arturo Macias   5/11/2023  9:15 AM Fernando Landaverde  [facility]

## 2023-05-08 ENCOUNTER — OFFICE VISIT (OUTPATIENT)
Dept: FAMILY MEDICINE CLINIC | Age: 63
End: 2023-05-08
Payer: COMMERCIAL

## 2023-05-08 VITALS
RESPIRATION RATE: 14 BRPM | DIASTOLIC BLOOD PRESSURE: 80 MMHG | TEMPERATURE: 97.2 F | SYSTOLIC BLOOD PRESSURE: 110 MMHG | WEIGHT: 291 LBS | OXYGEN SATURATION: 98 % | HEIGHT: 63 IN | HEART RATE: 55 BPM | BODY MASS INDEX: 51.56 KG/M2

## 2023-05-08 DIAGNOSIS — M25.561 ACUTE PAIN OF RIGHT KNEE: Primary | ICD-10-CM

## 2023-05-08 DIAGNOSIS — L40.9 PSORIASIS: ICD-10-CM

## 2023-05-08 PROCEDURE — 99213 OFFICE O/P EST LOW 20 MIN: CPT | Performed by: FAMILY MEDICINE

## 2023-05-08 RX ORDER — CLOBETASOL PROPIONATE 0.5 MG/G
OINTMENT TOPICAL
Qty: 30 G | Refills: 0 | Status: SHIPPED | OUTPATIENT
Start: 2023-05-08

## 2023-05-08 SDOH — ECONOMIC STABILITY: FOOD INSECURITY: WITHIN THE PAST 12 MONTHS, YOU WORRIED THAT YOUR FOOD WOULD RUN OUT BEFORE YOU GOT MONEY TO BUY MORE.: NEVER TRUE

## 2023-05-08 SDOH — ECONOMIC STABILITY: FOOD INSECURITY: WITHIN THE PAST 12 MONTHS, THE FOOD YOU BOUGHT JUST DIDN'T LAST AND YOU DIDN'T HAVE MONEY TO GET MORE.: NEVER TRUE

## 2023-05-08 SDOH — ECONOMIC STABILITY: HOUSING INSECURITY
IN THE LAST 12 MONTHS, WAS THERE A TIME WHEN YOU DID NOT HAVE A STEADY PLACE TO SLEEP OR SLEPT IN A SHELTER (INCLUDING NOW)?: NO

## 2023-05-08 SDOH — ECONOMIC STABILITY: INCOME INSECURITY: HOW HARD IS IT FOR YOU TO PAY FOR THE VERY BASICS LIKE FOOD, HOUSING, MEDICAL CARE, AND HEATING?: NOT HARD AT ALL

## 2023-05-08 ASSESSMENT — PATIENT HEALTH QUESTIONNAIRE - PHQ9
SUM OF ALL RESPONSES TO PHQ QUESTIONS 1-9: 0
SUM OF ALL RESPONSES TO PHQ QUESTIONS 1-9: 0
1. LITTLE INTEREST OR PLEASURE IN DOING THINGS: 0
SUM OF ALL RESPONSES TO PHQ QUESTIONS 1-9: 0
SUM OF ALL RESPONSES TO PHQ QUESTIONS 1-9: 0
SUM OF ALL RESPONSES TO PHQ9 QUESTIONS 1 & 2: 0
2. FEELING DOWN, DEPRESSED OR HOPELESS: 0

## 2023-05-08 NOTE — PROGRESS NOTES
tablet 0     No current facility-administered medications for this visit. Family History   Problem Relation Age of Onset    Heart Attack Paternal Grandmother     Stroke Maternal Grandmother     Cancer Maternal Grandmother     Heart Disease Father     Hypertension Father     Diabetes Mother     Heart Disease Mother     Liver Cancer Brother     Diabetes Sister     Heart Defect Sister     Colon Cancer Neg Hx      Past Medical History:   Diagnosis Date    Hypertension     Obesity    Xray showing mild arthritic changes   Objective:   /80   Pulse 55   Temp 97.2 °F (36.2 °C)   Resp 14   Ht 5' 3\" (1.6 m)   Wt 291 lb (132 kg)   LMP 01/01/2015 (Within Months)   SpO2 98%   BMI 51.55 kg/m²     Physical Exam  right Knee    Swelling: Mild   Effusion: Trace   Tenderness: Medial joint line       Range of Motion:      Extension: Normal     Flexion: Normal       McMurrays: Negative   Anterior Lachmann: Negative   Posterior Lachmann: Negative   Anterior Drawer: Negative   Posterior Drawer: Negative   Varus Stress Test: Negative   Valgus Stress Test: Negative   Pivot Shift: Negative   Patellar Apprehension: No     Pulses; P.T 1+ and equal   Skin;  silvery  plaques along anterior aspect of left lower leg                       Assessment:       Diagnosis Orders   1. Acute pain of right knee  XR KNEE RIGHT (3 VIEWS)      2. Psoriasis               Plan:         Will continue to avoid aggravating activities for now   Fasting blood work and f/u in 2-3 weeks

## 2023-06-08 DIAGNOSIS — I10 HYPERTENSION, UNSPECIFIED TYPE: ICD-10-CM

## 2023-06-08 LAB
ALBUMIN SERPL-MCNC: 3.6 G/DL (ref 3.5–4.6)
ALP SERPL-CCNC: 80 U/L (ref 40–130)
ALT SERPL-CCNC: 12 U/L (ref 0–33)
ANION GAP SERPL CALCULATED.3IONS-SCNC: 11 MEQ/L (ref 9–15)
AST SERPL-CCNC: 19 U/L (ref 0–35)
BASOPHILS # BLD: 0.1 K/UL (ref 0–0.2)
BASOPHILS NFR BLD: 0.7 %
BILIRUB SERPL-MCNC: <0.2 MG/DL (ref 0.2–0.7)
BUN SERPL-MCNC: 15 MG/DL (ref 8–23)
CALCIUM SERPL-MCNC: 8.7 MG/DL (ref 8.5–9.9)
CHLORIDE SERPL-SCNC: 109 MEQ/L (ref 95–107)
CHOLEST SERPL-MCNC: 191 MG/DL (ref 0–199)
CO2 SERPL-SCNC: 24 MEQ/L (ref 20–31)
CREAT SERPL-MCNC: 0.62 MG/DL (ref 0.5–0.9)
EOSINOPHIL # BLD: 0.4 K/UL (ref 0–0.7)
EOSINOPHIL NFR BLD: 6.2 %
ERYTHROCYTE [DISTWIDTH] IN BLOOD BY AUTOMATED COUNT: 14.9 % (ref 11.5–14.5)
GLOBULIN SER CALC-MCNC: 3.2 G/DL (ref 2.3–3.5)
GLUCOSE SERPL-MCNC: 96 MG/DL (ref 70–99)
HCT VFR BLD AUTO: 41.7 % (ref 37–47)
HDLC SERPL-MCNC: 47 MG/DL (ref 40–59)
HGB BLD-MCNC: 13.7 G/DL (ref 12–16)
LDLC SERPL CALC-MCNC: 119 MG/DL (ref 0–129)
LYMPHOCYTES # BLD: 2.6 K/UL (ref 1–4.8)
LYMPHOCYTES NFR BLD: 36.4 %
MCH RBC QN AUTO: 30.3 PG (ref 27–31.3)
MCHC RBC AUTO-ENTMCNC: 32.8 % (ref 33–37)
MCV RBC AUTO: 92.5 FL (ref 79.4–94.8)
MONOCYTES # BLD: 0.5 K/UL (ref 0.2–0.8)
MONOCYTES NFR BLD: 7.4 %
NEUTROPHILS # BLD: 3.5 K/UL (ref 1.4–6.5)
NEUTS SEG NFR BLD: 49.3 %
PLATELET # BLD AUTO: 193 K/UL (ref 130–400)
POTASSIUM SERPL-SCNC: 4.2 MEQ/L (ref 3.4–4.9)
PROT SERPL-MCNC: 6.8 G/DL (ref 6.3–8)
RBC # BLD AUTO: 4.51 M/UL (ref 4.2–5.4)
SODIUM SERPL-SCNC: 144 MEQ/L (ref 135–144)
TRIGL SERPL-MCNC: 127 MG/DL (ref 0–150)
WBC # BLD AUTO: 7.2 K/UL (ref 4.8–10.8)

## 2023-06-26 ENCOUNTER — OFFICE VISIT (OUTPATIENT)
Dept: FAMILY MEDICINE CLINIC | Age: 63
End: 2023-06-26
Payer: COMMERCIAL

## 2023-06-26 VITALS
BODY MASS INDEX: 51.38 KG/M2 | RESPIRATION RATE: 17 BRPM | SYSTOLIC BLOOD PRESSURE: 132 MMHG | WEIGHT: 290 LBS | HEART RATE: 69 BPM | HEIGHT: 63 IN | DIASTOLIC BLOOD PRESSURE: 76 MMHG | OXYGEN SATURATION: 98 % | TEMPERATURE: 97.9 F

## 2023-06-26 DIAGNOSIS — I10 HYPERTENSION, UNSPECIFIED TYPE: Primary | ICD-10-CM

## 2023-06-26 PROCEDURE — 3075F SYST BP GE 130 - 139MM HG: CPT | Performed by: FAMILY MEDICINE

## 2023-06-26 PROCEDURE — 3078F DIAST BP <80 MM HG: CPT | Performed by: FAMILY MEDICINE

## 2023-06-26 PROCEDURE — 99213 OFFICE O/P EST LOW 20 MIN: CPT | Performed by: FAMILY MEDICINE

## 2023-06-26 ASSESSMENT — PATIENT HEALTH QUESTIONNAIRE - PHQ9
SUM OF ALL RESPONSES TO PHQ QUESTIONS 1-9: 0
SUM OF ALL RESPONSES TO PHQ9 QUESTIONS 1 & 2: 0
SUM OF ALL RESPONSES TO PHQ QUESTIONS 1-9: 0
1. LITTLE INTEREST OR PLEASURE IN DOING THINGS: 0
2. FEELING DOWN, DEPRESSED OR HOPELESS: 0

## 2023-06-26 ASSESSMENT — ENCOUNTER SYMPTOMS
DIARRHEA: 0
VOMITING: 0
COUGH: 0

## 2023-08-07 RX ORDER — METOPROLOL TARTRATE 50 MG/1
50 TABLET, FILM COATED ORAL 2 TIMES DAILY
Qty: 60 TABLET | Refills: 5 | Status: SHIPPED | OUTPATIENT
Start: 2023-08-07

## 2023-08-07 NOTE — TELEPHONE ENCOUNTER
Comments: pt called for refill. Last Office Visit (last PCP visit):   11/22/2022    Next Visit Date:  Future Appointments   Date Time Provider [address]   11/27/2023 10:30 AM Hallie Tabares MD [address] [place] [address]       **If hasn't been seen in over a year OR hasn't followed up according to last diabetes/ADHD visit, make appointment for patient before sending refill to provider.     Rx requested:  Requested Prescriptions     Pending Prescriptions Disp Refills    rivaroxaban (XARELTO) 20 MG TABS tablet 30 tablet 5     Sig: Take 1 tablet by mouth daily    metoprolol tartrate (LOPRESSOR) 50 MG tablet 60 tablet 5     Sig: Take 1 tablet by mouth 2 times daily

## 2023-08-21 ENCOUNTER — TELEPHONE (OUTPATIENT)
Dept: CARDIOLOGY CLINIC | Age: 63
End: 2023-08-21

## 2023-08-21 NOTE — TELEPHONE ENCOUNTER
Pt had a tooth pulled and is calling to ask if it is ok to take tylenol 3?     Pt # [phone] you can leave vm

## 2023-10-30 ENCOUNTER — TELEPHONE (OUTPATIENT)
Dept: CARDIOLOGY CLINIC | Age: 63
End: 2023-10-30

## 2023-10-30 NOTE — TELEPHONE ENCOUNTER
Patient lost insurance please call when samples of xarelto 20mg are ready also please include pt assistance forms for patient to complete

## 2024-01-02 RX ORDER — RIVAROXABAN 10 MG/1
20 TABLET, FILM COATED ORAL DAILY
Qty: 56 TABLET | Refills: 0 | Status: SHIPPED | COMMUNITY
Start: 2024-01-02

## 2024-01-02 RX ORDER — RIVAROXABAN 10 MG/1
20 TABLET, FILM COATED ORAL DAILY
COMMUNITY
End: 2024-01-02 | Stop reason: SDUPTHER

## 2024-02-12 ENCOUNTER — TELEPHONE (OUTPATIENT)
Dept: CARDIOLOGY CLINIC | Age: 64
End: 2024-02-12

## 2024-02-15 NOTE — TELEPHONE ENCOUNTER
Xarelto 20mg; 4 bottles (4)   Lot: [id number]  EXP: 10/2025    [facility].

## 2024-03-15 RX ORDER — METOPROLOL TARTRATE 50 MG/1
50 TABLET, FILM COATED ORAL 2 TIMES DAILY
Qty: 60 TABLET | Refills: 5 | OUTPATIENT
Start: 2024-03-15

## 2024-03-15 NOTE — TELEPHONE ENCOUNTER
Patient needs an appointment. No refills given.     Requesting medication refill. Please approve or deny this request.    Rx requested:  Requested Prescriptions     Pending Prescriptions Disp Refills    metoprolol tartrate (LOPRESSOR) 50 MG tablet [Pharmacy Med Name: metoprolol tartrate 50 mg tablet] 60 tablet 5     Sig: TAKE 1 TABLET BY MOUTH TWICE DAILY         Last Office Visit:   11/22/2022      Next Visit Date:  No future appointments.            Last refill 08/07/2023. Please approve or deny.

## 2024-03-19 DIAGNOSIS — I10 HYPERTENSION, UNSPECIFIED TYPE: Primary | ICD-10-CM

## 2024-03-19 RX ORDER — METOPROLOL TARTRATE 50 MG/1
50 TABLET, FILM COATED ORAL 2 TIMES DAILY
Qty: 60 TABLET | Refills: 5 | Status: SHIPPED | OUTPATIENT
Start: 2024-03-19

## 2024-03-19 NOTE — TELEPHONE ENCOUNTER
PT HAS UPCOMING APPT ON 4/4/2024    Requesting medication refill. Please approve or deny this request.    Rx requested:  Requested Prescriptions     Pending Prescriptions Disp Refills    metoprolol tartrate (LOPRESSOR) 50 MG tablet 60 tablet 5     Sig: Take 1 tablet by mouth 2 times daily         Last Office Visit:   11/22/2022      Next Visit Date:  Future Appointments   Date Time Provider Department Center   4/4/2024  3:00 PM Zohaib Frias MD [facility]               Please approve or deny.

## 2024-04-04 ENCOUNTER — TELEPHONE (OUTPATIENT)
Dept: CARDIOLOGY CLINIC | Age: 64
End: 2024-04-04

## 2024-04-04 ENCOUNTER — OFFICE VISIT (OUTPATIENT)
Dept: CARDIOLOGY CLINIC | Age: 64
End: 2024-04-04
Payer: COMMERCIAL

## 2024-04-04 VITALS
HEIGHT: 63 IN | SYSTOLIC BLOOD PRESSURE: 124 MMHG | OXYGEN SATURATION: 99 % | DIASTOLIC BLOOD PRESSURE: 78 MMHG | BODY MASS INDEX: 51.91 KG/M2 | WEIGHT: 293 LBS | HEART RATE: 86 BPM

## 2024-04-04 DIAGNOSIS — Z00.00 PE (PHYSICAL EXAM), ANNUAL: Primary | ICD-10-CM

## 2024-04-04 DIAGNOSIS — I48.91 ATRIAL FIBRILLATION, UNSPECIFIED TYPE (HCC): ICD-10-CM

## 2024-04-04 DIAGNOSIS — I10 HYPERTENSION, UNSPECIFIED TYPE: ICD-10-CM

## 2024-04-04 PROCEDURE — 93000 ELECTROCARDIOGRAM COMPLETE: CPT | Performed by: INTERNAL MEDICINE

## 2024-04-04 PROCEDURE — 3074F SYST BP LT 130 MM HG: CPT | Performed by: INTERNAL MEDICINE

## 2024-04-04 PROCEDURE — 99214 OFFICE O/P EST MOD 30 MIN: CPT | Performed by: INTERNAL MEDICINE

## 2024-04-04 PROCEDURE — 3078F DIAST BP <80 MM HG: CPT | Performed by: INTERNAL MEDICINE

## 2024-04-04 ASSESSMENT — ENCOUNTER SYMPTOMS
WHEEZING: 0
CHEST TIGHTNESS: 0
SHORTNESS OF BREATH: 0
BLOOD IN STOOL: 0
STRIDOR: 0
RESPIRATORY NEGATIVE: 1
GASTROINTESTINAL NEGATIVE: 1
NAUSEA: 0

## 2024-04-04 NOTE — PROGRESS NOTES
Subsequent Progress Note    Patient: Rosario Amado  YOB: 1960  MRN: [id number]    Chief Complaint: AF  Chief Complaint   Patient presents with    Follow-up     LOV: 2022 for DELGADO       CV Data:   Echo EF 50%  Subjective/HPI: recent hosp for AF. She was able to sense AF. Had these in the past as well TSK and lytes WNL.  Pt did have DELGADO.     24 I have not seen pt in a while. No cp no sob no falls no bleed state she feels well. She did not get stress due to cost and insurance.      EKG: SR 77    Former smoker  Occ etoh  Lives alone -   +FH  retired    Past Medical History:   Diagnosis Date    Hypertension     Obesity        Past Surgical History:   Procedure Laterality Date    CARPAL TUNNEL RELEASE      COLONOSCOPY N/A 2022    COLORECTAL CANCER SCREENING, NOT HIGH RISK performed by Rangel Perez MD at [facility]    TONSILLECTOMY      UPPER GASTROINTESTINAL ENDOSCOPY N/A 2022    EGD DIAGNOSTIC ONLY performed by Rangel Perez MD at [facility]       Family History   Problem Relation Age of Onset    Heart Attack Paternal Grandmother     Stroke Maternal Grandmother     Cancer Maternal Grandmother     Heart Disease Father     Hypertension Father     Diabetes Mother     Heart Disease Mother     Liver Cancer Brother     Diabetes Sister     Heart Defect Sister     Colon Cancer Neg Hx        Social History     Socioeconomic History    Marital status:      Spouse name: None    Number of children: None    Years of education: None    Highest education level: None   Tobacco Use    Smoking status: Former     Current packs/day: 0.00     Average packs/day: 1 pack/day for 7.0 years (7.0 ttl pk-yrs)     Types: Cigarettes     Start date: 1977     Quit date: 1984     Years since quittin.2    Smokeless tobacco: Never   Vaping Use    Vaping Use: Never used   Substance and Sexual Activity    Alcohol use: Not Currently     Comment: very occationally    Drug use: Never

## 2024-05-02 ENCOUNTER — TELEPHONE (OUTPATIENT)
Dept: FAMILY MEDICINE CLINIC | Age: 64
End: 2024-05-02

## 2024-05-02 ENCOUNTER — OFFICE VISIT (OUTPATIENT)
Dept: FAMILY MEDICINE CLINIC | Age: 64
End: 2024-05-02
Payer: COMMERCIAL

## 2024-05-02 VITALS
OXYGEN SATURATION: 98 % | SYSTOLIC BLOOD PRESSURE: 120 MMHG | HEIGHT: 63 IN | TEMPERATURE: 97.6 F | DIASTOLIC BLOOD PRESSURE: 82 MMHG | WEIGHT: 293 LBS | BODY MASS INDEX: 51.91 KG/M2 | HEART RATE: 76 BPM

## 2024-05-02 DIAGNOSIS — I10 PRIMARY HYPERTENSION: ICD-10-CM

## 2024-05-02 DIAGNOSIS — H60.502 ACUTE OTITIS EXTERNA OF LEFT EAR, UNSPECIFIED TYPE: ICD-10-CM

## 2024-05-02 DIAGNOSIS — I48.91 ATRIAL FIBRILLATION, UNSPECIFIED TYPE (HCC): ICD-10-CM

## 2024-05-02 DIAGNOSIS — Z00.00 HEALTH MAINTENANCE EXAMINATION: Primary | ICD-10-CM

## 2024-05-02 PROCEDURE — 99396 PREV VISIT EST AGE 40-64: CPT | Performed by: FAMILY MEDICINE

## 2024-05-02 PROCEDURE — 3074F SYST BP LT 130 MM HG: CPT | Performed by: FAMILY MEDICINE

## 2024-05-02 PROCEDURE — 3079F DIAST BP 80-89 MM HG: CPT | Performed by: FAMILY MEDICINE

## 2024-05-02 RX ORDER — NEOMYCIN SULFATE, POLYMYXIN B SULFATE AND HYDROCORTISONE 10; 3.5; 1 MG/ML; MG/ML; [USP'U]/ML
SUSPENSION/ DROPS AURICULAR (OTIC)
Qty: 10 ML | Refills: 0 | Status: SHIPPED | OUTPATIENT
Start: 2024-05-02

## 2024-05-02 RX ORDER — AZITHROMYCIN 250 MG/1
TABLET, FILM COATED ORAL
Qty: 6 TABLET | Refills: 0 | Status: SHIPPED | OUTPATIENT
Start: 2024-05-02 | End: 2024-05-12

## 2024-05-02 ASSESSMENT — PATIENT HEALTH QUESTIONNAIRE - PHQ9
SUM OF ALL RESPONSES TO PHQ QUESTIONS 1-9: 0
SUM OF ALL RESPONSES TO PHQ9 QUESTIONS 1 & 2: 0
SUM OF ALL RESPONSES TO PHQ QUESTIONS 1-9: 0
SUM OF ALL RESPONSES TO PHQ QUESTIONS 1-9: 0
1. LITTLE INTEREST OR PLEASURE IN DOING THINGS: NOT AT ALL
2. FEELING DOWN, DEPRESSED OR HOPELESS: NOT AT ALL
SUM OF ALL RESPONSES TO PHQ QUESTIONS 1-9: 0

## 2024-05-02 ASSESSMENT — ENCOUNTER SYMPTOMS
NAUSEA: 0
VOMITING: 0

## 2024-05-02 NOTE — PROGRESS NOTES
Subjective:      Patient ID: Rosario Amado is a 63 y.o. female    Ear Fullness   Associated symptoms include ear discharge. Pertinent negatives include no rash or vomiting.   Here for physical.    Here with about 6 days of left ear feeling blocked.  Occasional pain of ear.  Thinks may have had slight cold then.  Slight sore throat.  Slight cough.  No fever or chills.  Slight nasal congestion  no drainage out of ear.  Admits does use q tips in ears often.  Hx of a fib. Currently managed by cardiology    Review of Systems   Constitutional:  Negative for chills.   HENT:  Positive for congestion and ear discharge.    Cardiovascular:  Negative for chest pain.   Gastrointestinal:  Negative for nausea and vomiting.   Skin:  Negative for rash.     Reviewed allergy, medical, social, surgical, family and med list changes and updated   Files     Social History     Socioeconomic History   • Marital status:    Tobacco Use   • Smoking status: Former     Current packs/day: 0.00     Average packs/day: 1 pack/day for 7.0 years (7.0 ttl pk-yrs)     Types: Cigarettes     Start date: 1977     Quit date: 1984     Years since quittin.3   • Smokeless tobacco: Never   Vaping Use   • Vaping Use: Never used   Substance and Sexual Activity   • Alcohol use: Not Currently     Comment: very occationally   • Drug use: Never   • Sexual activity: Yes     Partners: Male     Social Determinants of Health     Financial Resource Strain: Low Risk  (2023)    Overall Financial Resource Strain (CARDIA)    • Difficulty of Paying Living Expenses: Not hard at all   Transportation Needs: Unknown (2023)    PRAPARE - Transportation    • Lack of Transportation (Non-Medical): No   Housing Stability: Unknown (2023)    Housing Stability Vital Sign    • Unstable Housing in the Last Year: No     Current Outpatient Medications   Medication Sig Dispense Refill   • rivaroxaban (XARELTO) 20 MG TABS tablet Take 1 tablet by mouth daily 35

## 2024-05-02 NOTE — TELEPHONE ENCOUNTER
Patient did not schedule follow up appointment, she stated she would call the office to schedule her appointment.

## 2024-05-29 NOTE — TELEPHONE ENCOUNTER
Requesting medication refill. Please approve or deny this request.    Rx requested:  Requested Prescriptions     Pending Prescriptions Disp Refills    rivaroxaban (XARELTO) 20 MG TABS tablet 35 tablet 0     Sig: Take 1 tablet by mouth daily         Last Office Visit:   4/4/2024      Next Visit Date:  Future Appointments   Date Time Provider Department Center   10/7/2024 12:30 PM Zohaib Frias MD [facility]

## 2024-07-01 ENCOUNTER — TELEPHONE (OUTPATIENT)
Dept: CARDIOLOGY CLINIC | Age: 64
End: 2024-07-01

## 2024-07-01 NOTE — TELEPHONE ENCOUNTER
Pt requesting samples of Xarelto 20 MG.     Pt given 4 bottles of Xarelto 20 MG.   LOT: [id number]  EXP: 2-2026

## 2024-07-29 ENCOUNTER — TELEPHONE (OUTPATIENT)
Dept: CARDIOLOGY CLINIC | Age: 64
End: 2024-07-29

## 2024-08-14 NOTE — TELEPHONE ENCOUNTER
PATIENT AWARE SAMPLES OF ELIQUIS  5MG TWICE DAILY ARE READY FOR . AWARE THIS IS TO TAKE UNTIL WE GET SAMPLES  OF XARELTO ARE AVAILABLE. SHE IS AWARE NOT TO TAKE XARELTO AND ELIQUIS TOGETHER .

## 2024-09-09 ENCOUNTER — TELEPHONE (OUTPATIENT)
Dept: CARDIOLOGY CLINIC | Age: 64
End: 2024-09-09

## 2024-09-25 ENCOUNTER — TELEPHONE (OUTPATIENT)
Dept: CARDIOLOGY CLINIC | Age: 64
End: 2024-09-25

## 2024-09-27 DIAGNOSIS — I10 PRIMARY HYPERTENSION: ICD-10-CM

## 2024-09-27 LAB
ALBUMIN SERPL-MCNC: 3.8 G/DL (ref 3.5–4.6)
ALP SERPL-CCNC: 99 U/L (ref 40–130)
ALT SERPL-CCNC: 29 U/L (ref 0–33)
ANION GAP SERPL CALCULATED.3IONS-SCNC: 9 MEQ/L (ref 9–15)
AST SERPL-CCNC: 33 U/L (ref 0–35)
BASOPHILS # BLD: 0.1 K/UL (ref 0–0.2)
BASOPHILS NFR BLD: 0.7 %
BILIRUB SERPL-MCNC: 0.3 MG/DL (ref 0.2–0.7)
BUN SERPL-MCNC: 13 MG/DL (ref 8–23)
CALCIUM SERPL-MCNC: 8.8 MG/DL (ref 8.5–9.9)
CHLORIDE SERPL-SCNC: 102 MEQ/L (ref 95–107)
CHOLEST SERPL-MCNC: 200 MG/DL (ref 0–199)
CO2 SERPL-SCNC: 28 MEQ/L (ref 20–31)
CREAT SERPL-MCNC: 0.79 MG/DL (ref 0.5–0.9)
EOSINOPHIL # BLD: 0.5 K/UL (ref 0–0.7)
EOSINOPHIL NFR BLD: 6.5 %
ERYTHROCYTE [DISTWIDTH] IN BLOOD BY AUTOMATED COUNT: 14.4 % (ref 11.5–14.5)
GLOBULIN SER CALC-MCNC: 3.5 G/DL (ref 2.3–3.5)
GLUCOSE SERPL-MCNC: 104 MG/DL (ref 70–99)
HCT VFR BLD AUTO: 44.9 % (ref 37–47)
HDLC SERPL-MCNC: 42 MG/DL (ref 40–59)
HGB BLD-MCNC: 14 G/DL (ref 12–16)
LDLC SERPL CALC-MCNC: 131 MG/DL (ref 0–129)
LYMPHOCYTES # BLD: 3.2 K/UL (ref 1–4.8)
LYMPHOCYTES NFR BLD: 40.1 %
MCH RBC QN AUTO: 28.7 PG (ref 27–31.3)
MCHC RBC AUTO-ENTMCNC: 31.2 % (ref 33–37)
MCV RBC AUTO: 92 FL (ref 79.4–94.8)
MONOCYTES # BLD: 0.6 K/UL (ref 0.2–0.8)
MONOCYTES NFR BLD: 7.8 %
NEUTROPHILS # BLD: 3.6 K/UL (ref 1.4–6.5)
NEUTS SEG NFR BLD: 44.7 %
PLATELET # BLD AUTO: 259 K/UL (ref 130–400)
POTASSIUM SERPL-SCNC: 4.4 MEQ/L (ref 3.4–4.9)
PROT SERPL-MCNC: 7.3 G/DL (ref 6.3–8)
RBC # BLD AUTO: 4.88 M/UL (ref 4.2–5.4)
SODIUM SERPL-SCNC: 139 MEQ/L (ref 135–144)
TRIGL SERPL-MCNC: 137 MG/DL (ref 0–150)
WBC # BLD AUTO: 8 K/UL (ref 4.8–10.8)

## 2024-10-04 ENCOUNTER — OFFICE VISIT (OUTPATIENT)
Dept: FAMILY MEDICINE CLINIC | Age: 64
End: 2024-10-04

## 2024-10-04 VITALS — SYSTOLIC BLOOD PRESSURE: 120 MMHG | HEIGHT: 63 IN | BODY MASS INDEX: 53.85 KG/M2 | DIASTOLIC BLOOD PRESSURE: 78 MMHG

## 2024-10-04 DIAGNOSIS — I10 PRIMARY HYPERTENSION: Primary | ICD-10-CM

## 2024-10-04 DIAGNOSIS — R73.03 PRE-DIABETES: ICD-10-CM

## 2024-10-04 DIAGNOSIS — I48.91 ATRIAL FIBRILLATION, UNSPECIFIED TYPE (HCC): ICD-10-CM

## 2024-10-04 DIAGNOSIS — E78.00 PURE HYPERCHOLESTEROLEMIA: ICD-10-CM

## 2024-10-04 DIAGNOSIS — Z12.31 ENCOUNTER FOR SCREENING MAMMOGRAM FOR MALIGNANT NEOPLASM OF BREAST: ICD-10-CM

## 2024-10-04 SDOH — ECONOMIC STABILITY: FOOD INSECURITY: WITHIN THE PAST 12 MONTHS, YOU WORRIED THAT YOUR FOOD WOULD RUN OUT BEFORE YOU GOT MONEY TO BUY MORE.: NEVER TRUE

## 2024-10-04 SDOH — ECONOMIC STABILITY: FOOD INSECURITY: WITHIN THE PAST 12 MONTHS, THE FOOD YOU BOUGHT JUST DIDN'T LAST AND YOU DIDN'T HAVE MONEY TO GET MORE.: NEVER TRUE

## 2024-10-04 SDOH — ECONOMIC STABILITY: INCOME INSECURITY: HOW HARD IS IT FOR YOU TO PAY FOR THE VERY BASICS LIKE FOOD, HOUSING, MEDICAL CARE, AND HEATING?: NOT HARD AT ALL

## 2024-10-04 ASSESSMENT — ENCOUNTER SYMPTOMS
VOMITING: 0
COUGH: 0
DIARRHEA: 0

## 2024-10-04 ASSESSMENT — PATIENT HEALTH QUESTIONNAIRE - PHQ9
SUM OF ALL RESPONSES TO PHQ QUESTIONS 1-9: 0
2. FEELING DOWN, DEPRESSED OR HOPELESS: NOT AT ALL
SUM OF ALL RESPONSES TO PHQ9 QUESTIONS 1 & 2: 0
SUM OF ALL RESPONSES TO PHQ QUESTIONS 1-9: 0
1. LITTLE INTEREST OR PLEASURE IN DOING THINGS: NOT AT ALL
SUM OF ALL RESPONSES TO PHQ QUESTIONS 1-9: 0
SUM OF ALL RESPONSES TO PHQ QUESTIONS 1-9: 0

## 2024-10-04 NOTE — PROGRESS NOTES
present. Soft  Non tender. No hepatosplenomegaly. No masses    Assessment:       Diagnosis Orders   1. Primary hypertension  Lipid Panel      2. Pure hypercholesterolemia  Lipid Panel      3. Pre-diabetes  Glucose, Fasting    Hemoglobin A1C      4. Atrial fibrillation, unspecified type (HCC)        5. Encounter for screening mammogram for malignant neoplasm of breast  ROB DIGITAL SCREEN W OR WO CAD BILATERAL             Plan:    Continue with eliquis for a fib and continue with lopressor for htn and will watch diet and increase activity and weight loss   Orders Placed This Encounter   Procedures    ROB DIGITAL SCREEN W OR WO CAD BILATERAL     Standing Status:   Future     Standing Expiration Date:   12/4/2025    Glucose, Fasting     Standing Status:   Future     Standing Expiration Date:   10/4/2025    Hemoglobin A1C     Standing Status:   Future     Standing Expiration Date:   10/4/2025    Lipid Panel     Standing Status:   Future     Standing Expiration Date:   10/4/2025      Fasting blood work in 6 months and f/u after done

## 2024-10-07 DIAGNOSIS — I10 HYPERTENSION, UNSPECIFIED TYPE: ICD-10-CM

## 2024-10-07 RX ORDER — METOPROLOL TARTRATE 50 MG
50 TABLET ORAL 2 TIMES DAILY
Qty: 60 TABLET | Refills: 5 | Status: SHIPPED | OUTPATIENT
Start: 2024-10-07

## 2024-10-07 NOTE — TELEPHONE ENCOUNTER
Requesting medication refill. Please approve or deny this request.    Rx requested:  Requested Prescriptions     Pending Prescriptions Disp Refills    metoprolol tartrate (LOPRESSOR) 50 MG tablet [Pharmacy Med Name: metoprolol tartrate 50 mg tablet] 60 tablet 5     Sig: Take 1 tablet by mouth 2 times daily         Last Office Visit:   4/4/2024      Next Visit Date:  Future Appointments   Date Time Provider Department Center   4/9/2025  9:15 AM Daniel Mckee MD [facility]               Last refill 03/19/2024. Please approve or deny.

## 2024-11-04 ENCOUNTER — TELEPHONE (OUTPATIENT)
Dept: CARDIOLOGY CLINIC | Age: 64
End: 2024-11-04

## 2024-11-04 NOTE — TELEPHONE ENCOUNTER
Pt requesting samples of Eliquis 5 MG.    Pt given 4 boxes of Eliquis 5 MG (14t)    LOT: [id number]  EXP: JAN 2026

## 2024-12-04 ENCOUNTER — TELEPHONE (OUTPATIENT)
Dept: CARDIOLOGY CLINIC | Age: 64
End: 2024-12-04

## 2025-01-07 ENCOUNTER — TELEPHONE (OUTPATIENT)
Dept: CARDIOLOGY CLINIC | Age: 65
End: 2025-01-07

## 2025-01-07 NOTE — TELEPHONE ENCOUNTER
Pt requesting samples of Eliquis.    Pt aware that we only have samples of Eliquis 5 MG.  So she will have to take double dosage.      PT GIVEN 4 BOXES OF ELIQUIS 2.5 MG.     LOT: [id number]  EXP: MAR 2026

## 2025-01-31 ENCOUNTER — OFFICE VISIT (OUTPATIENT)
Age: 65
End: 2025-01-31

## 2025-01-31 VITALS
BODY MASS INDEX: 51.91 KG/M2 | TEMPERATURE: 97.4 F | WEIGHT: 293 LBS | SYSTOLIC BLOOD PRESSURE: 126 MMHG | HEART RATE: 94 BPM | HEIGHT: 63 IN | DIASTOLIC BLOOD PRESSURE: 78 MMHG | OXYGEN SATURATION: 97 %

## 2025-01-31 DIAGNOSIS — H10.32 ACUTE CONJUNCTIVITIS OF LEFT EYE, UNSPECIFIED ACUTE CONJUNCTIVITIS TYPE: ICD-10-CM

## 2025-01-31 DIAGNOSIS — R06.2 WHEEZING: ICD-10-CM

## 2025-01-31 DIAGNOSIS — J20.9 ACUTE BRONCHITIS, UNSPECIFIED ORGANISM: Primary | ICD-10-CM

## 2025-01-31 LAB
INFLUENZA A ANTIBODY: NORMAL
INFLUENZA B ANTIBODY: NORMAL
Lab: NORMAL
PERFORMING INSTRUMENT: NORMAL
QC PASS/FAIL: NORMAL
RSV RAPID ANTIGEN: NORMAL
SARS-COV-2, POC: NORMAL

## 2025-01-31 RX ORDER — ALBUTEROL SULFATE 90 UG/1
2 INHALANT RESPIRATORY (INHALATION) EVERY 6 HOURS PRN
Qty: 18 G | Refills: 0 | Status: SHIPPED | OUTPATIENT
Start: 2025-01-31

## 2025-01-31 RX ORDER — SULFACETAMIDE SODIUM 100 MG/ML
SOLUTION/ DROPS OPHTHALMIC
Qty: 5 ML | Refills: 0 | Status: SHIPPED | OUTPATIENT
Start: 2025-01-31

## 2025-01-31 RX ORDER — PREDNISONE 10 MG/1
TABLET ORAL
Qty: 30 TABLET | Refills: 0 | Status: SHIPPED | OUTPATIENT
Start: 2025-01-31

## 2025-01-31 RX ORDER — AZITHROMYCIN 250 MG/1
TABLET, FILM COATED ORAL
Qty: 6 TABLET | Refills: 0 | Status: SHIPPED | OUTPATIENT
Start: 2025-01-31 | End: 2025-02-10

## 2025-01-31 SDOH — ECONOMIC STABILITY: FOOD INSECURITY: WITHIN THE PAST 12 MONTHS, THE FOOD YOU BOUGHT JUST DIDN'T LAST AND YOU DIDN'T HAVE MONEY TO GET MORE.: NEVER TRUE

## 2025-01-31 SDOH — ECONOMIC STABILITY: FOOD INSECURITY: WITHIN THE PAST 12 MONTHS, YOU WORRIED THAT YOUR FOOD WOULD RUN OUT BEFORE YOU GOT MONEY TO BUY MORE.: NEVER TRUE

## 2025-01-31 ASSESSMENT — PATIENT HEALTH QUESTIONNAIRE - PHQ9
SUM OF ALL RESPONSES TO PHQ QUESTIONS 1-9: 0
SUM OF ALL RESPONSES TO PHQ9 QUESTIONS 1 & 2: 0
1. LITTLE INTEREST OR PLEASURE IN DOING THINGS: NOT AT ALL
2. FEELING DOWN, DEPRESSED OR HOPELESS: NOT AT ALL

## 2025-01-31 ASSESSMENT — ENCOUNTER SYMPTOMS
SHORTNESS OF BREATH: 1
WHEEZING: 1
ABDOMINAL PAIN: 0

## 2025-01-31 NOTE — PROGRESS NOTES
Subjective:      Patient ID: Rosario Amado is a 64 y.o. female    HPI  Here with scratchy throat and cough that started about 2 days ago.  Sputum is discolored  no diarrhea or emesis.  No nasal congestion.  Exposed to rsv thru grandchildren earlier in week.  Does feel sob at times.  No fever.     Also developed left eye drainage 2 days ago.  Drainage is white.  Does wear contact lenses and still wearing them.   Vision is fine.  No eye pain.    Review of Systems   Constitutional:  Negative for chills.   Respiratory:  Positive for shortness of breath and wheezing.    Gastrointestinal:  Negative for abdominal pain.   Skin:  Negative for rash.     Reviewed allergy, medical, social, surgical, family and med list changes and updated   Files     Social History     Socioeconomic History    Marital status:      Spouse name: None    Number of children: None    Years of education: None    Highest education level: None   Tobacco Use    Smoking status: Former     Current packs/day: 0.00     Average packs/day: 1 pack/day for 7.0 years (7.0 ttl pk-yrs)     Types: Cigarettes     Start date: 1977     Quit date: 1984     Years since quittin.1    Smokeless tobacco: Never   Vaping Use    Vaping status: Never Used   Substance and Sexual Activity    Alcohol use: Not Currently     Comment: very occationally    Drug use: Never    Sexual activity: Yes     Partners: Male     Social Determinants of Health     Financial Resource Strain: Low Risk  (10/4/2024)    Overall Financial Resource Strain (CARDIA)     Difficulty of Paying Living Expenses: Not hard at all   Food Insecurity: No Food Insecurity (2025)    Hunger Vital Sign     Worried About Running Out of Food in the Last Year: Never true     Ran Out of Food in the Last Year: Never true   Transportation Needs: No Transportation Needs (2025)    PRAPARE - Transportation     Lack of Transportation (Medical): No     Lack of Transportation (Non-Medical): No

## 2025-03-14 ENCOUNTER — OFFICE VISIT (OUTPATIENT)
Dept: CARDIOLOGY CLINIC | Age: 65
End: 2025-03-14

## 2025-03-14 VITALS
DIASTOLIC BLOOD PRESSURE: 80 MMHG | WEIGHT: 293 LBS | SYSTOLIC BLOOD PRESSURE: 128 MMHG | OXYGEN SATURATION: 97 % | BODY MASS INDEX: 55.62 KG/M2 | HEART RATE: 75 BPM

## 2025-03-14 DIAGNOSIS — I48.91 ATRIAL FIBRILLATION, UNSPECIFIED TYPE (HCC): ICD-10-CM

## 2025-03-14 DIAGNOSIS — G47.33 OSA (OBSTRUCTIVE SLEEP APNEA): ICD-10-CM

## 2025-03-14 DIAGNOSIS — I10 HYPERTENSION, UNSPECIFIED TYPE: Primary | ICD-10-CM

## 2025-03-14 DIAGNOSIS — R06.02 SHORTNESS OF BREATH: ICD-10-CM

## 2025-03-14 RX ORDER — DABIGATRAN ETEXILATE 150 MG/1
150 CAPSULE ORAL 2 TIMES DAILY
Qty: 180 CAPSULE | Refills: 3 | Status: SHIPPED | OUTPATIENT
Start: 2025-03-14

## 2025-03-14 RX ORDER — ATORVASTATIN CALCIUM 40 MG/1
40 TABLET, FILM COATED ORAL DAILY
Qty: 30 TABLET | Refills: 3 | Status: SHIPPED | OUTPATIENT
Start: 2025-03-14

## 2025-03-14 ASSESSMENT — ENCOUNTER SYMPTOMS
CHEST TIGHTNESS: 0
STRIDOR: 0
RESPIRATORY NEGATIVE: 1
GASTROINTESTINAL NEGATIVE: 1
SHORTNESS OF BREATH: 0
EYES NEGATIVE: 1
BLOOD IN STOOL: 0
WHEEZING: 0
COUGH: 0
NAUSEA: 0

## 2025-03-14 NOTE — PROGRESS NOTES
Subsequent Progress Note    Patient: Rosario Amado  YOB: 1960  MRN: [id number]    Chief Complaint: AF  Chief Complaint   Patient presents with    Follow-up     Medications        CV Data:   Echo EF 50%  Subjective/HPI: recent hosp for AF. She was able to sense AF. Had these in the past as well TSK and lytes WNL.  Pt did have DELGADO.     24 I have not seen pt in a while. No cp no sob no falls no bleed state she feels well. She did not get stress due to cost and insurance.     3/14/25 still w occ palps that are brief. Has DELGADO. No cp no falls no bleed.  Pt stopped all meds due to cost.      EKG: SR 77    Former smoker  Occ etoh  Lives alone -   +FH  retired    Past Medical History:   Diagnosis Date    Hypertension     Obesity        Past Surgical History:   Procedure Laterality Date    CARPAL TUNNEL RELEASE      COLONOSCOPY N/A 2022    COLORECTAL CANCER SCREENING, NOT HIGH RISK performed by Rangel Perez MD at [facility]    TONSILLECTOMY      UPPER GASTROINTESTINAL ENDOSCOPY N/A 2022    EGD DIAGNOSTIC ONLY performed by Rangel Perez MD at [facility]       Family History   Problem Relation Age of Onset    Heart Attack Paternal Grandmother     Stroke Maternal Grandmother     Cancer Maternal Grandmother     Heart Disease Father     Hypertension Father     Diabetes Mother     Heart Disease Mother     Liver Cancer Brother     Diabetes Sister     Heart Defect Sister     Colon Cancer Neg Hx        Social History     Socioeconomic History    Marital status:    Tobacco Use    Smoking status: Former     Current packs/day: 0.00     Average packs/day: 1 pack/day for 7.0 years (7.0 ttl pk-yrs)     Types: Cigarettes     Start date: 1977     Quit date: 1984     Years since quittin.2    Smokeless tobacco: Never   Vaping Use    Vaping status: Never Used   Substance and Sexual Activity    Alcohol use: Not Currently     Comment: very occationally    Drug use: Never

## 2025-03-24 ENCOUNTER — TELEPHONE (OUTPATIENT)
Dept: CARDIOLOGY CLINIC | Age: 65
End: 2025-03-24

## 2025-03-24 NOTE — TELEPHONE ENCOUNTER
PT CALLING STATING THE PRADAXA COULD BE CAUSING BLOOD AND CLOTS IN URINE AND BLURRED VISION NTHROBBING ON TOP LIP ...  I ADVISE TO STOP MEDICATION IMMEDIATELY...       PLEASE ADVISE AND CALL PT BACK WITH WHAT TO DO NEXT......

## 2025-03-25 NOTE — TELEPHONE ENCOUNTER
Per Dr. Frias, patient needs an appointment.     Called and spoke to patient. Patient scheduled for appt with Dr. Frias 03/28/2025 1145AM

## 2025-03-28 ENCOUNTER — OFFICE VISIT (OUTPATIENT)
Dept: CARDIOLOGY CLINIC | Age: 65
End: 2025-03-28

## 2025-03-28 VITALS
WEIGHT: 293 LBS | OXYGEN SATURATION: 97 % | SYSTOLIC BLOOD PRESSURE: 138 MMHG | BODY MASS INDEX: 55.27 KG/M2 | HEART RATE: 79 BPM | DIASTOLIC BLOOD PRESSURE: 88 MMHG

## 2025-03-28 DIAGNOSIS — Z00.00 PE (PHYSICAL EXAM), ANNUAL: ICD-10-CM

## 2025-03-28 DIAGNOSIS — R06.02 SHORTNESS OF BREATH: ICD-10-CM

## 2025-03-28 DIAGNOSIS — G47.33 OSA (OBSTRUCTIVE SLEEP APNEA): ICD-10-CM

## 2025-03-28 DIAGNOSIS — I48.91 ATRIAL FIBRILLATION, UNSPECIFIED TYPE (HCC): ICD-10-CM

## 2025-03-28 DIAGNOSIS — I10 HYPERTENSION, UNSPECIFIED TYPE: ICD-10-CM

## 2025-03-28 DIAGNOSIS — I10 HYPERTENSION, UNSPECIFIED TYPE: Primary | ICD-10-CM

## 2025-03-28 LAB
ALBUMIN SERPL-MCNC: 3.9 G/DL (ref 3.5–4.6)
ALP SERPL-CCNC: 77 U/L (ref 40–130)
ALT SERPL-CCNC: 33 U/L (ref 0–33)
ANION GAP SERPL CALCULATED.3IONS-SCNC: 8 MEQ/L (ref 9–15)
AST SERPL-CCNC: 35 U/L (ref 0–35)
BACTERIA URNS QL MICRO: NEGATIVE /HPF
BILIRUB SERPL-MCNC: 0.3 MG/DL (ref 0.2–0.7)
BILIRUB UR QL STRIP: NEGATIVE
BUN SERPL-MCNC: 12 MG/DL (ref 8–23)
CALCIUM SERPL-MCNC: 8.8 MG/DL (ref 8.5–9.9)
CHLORIDE SERPL-SCNC: 105 MEQ/L (ref 95–107)
CHOLEST SERPL-MCNC: 184 MG/DL (ref 0–199)
CLARITY UR: ABNORMAL
CO2 SERPL-SCNC: 25 MEQ/L (ref 20–31)
COLOR UR: YELLOW
CREAT SERPL-MCNC: 0.68 MG/DL (ref 0.5–0.9)
EPI CELLS #/AREA URNS AUTO: ABNORMAL /HPF (ref 0–5)
ERYTHROCYTE [DISTWIDTH] IN BLOOD BY AUTOMATED COUNT: 14.2 % (ref 11.5–14.5)
GLOBULIN SER CALC-MCNC: 3.2 G/DL (ref 2.3–3.5)
GLUCOSE SERPL-MCNC: 97 MG/DL (ref 70–99)
GLUCOSE UR STRIP-MCNC: NEGATIVE MG/DL
HCT VFR BLD AUTO: 43.5 % (ref 37–47)
HDLC SERPL-MCNC: 43 MG/DL (ref 40–59)
HGB BLD-MCNC: 14.2 G/DL (ref 12–16)
HGB UR QL STRIP: ABNORMAL
HYALINE CASTS #/AREA URNS AUTO: ABNORMAL /HPF (ref 0–5)
KETONES UR STRIP-MCNC: NEGATIVE MG/DL
LDL CHOLESTEROL: 114 MG/DL (ref 0–129)
LEUKOCYTE ESTERASE UR QL STRIP: ABNORMAL
MCH RBC QN AUTO: 29.4 PG (ref 27–31.3)
MCHC RBC AUTO-ENTMCNC: 32.6 % (ref 33–37)
MCV RBC AUTO: 90.1 FL (ref 79.4–94.8)
NITRITE UR QL STRIP: NEGATIVE
PH UR STRIP: 7 [PH] (ref 5–9)
PLATELET # BLD AUTO: 233 K/UL (ref 130–400)
POTASSIUM SERPL-SCNC: 4.2 MEQ/L (ref 3.4–4.9)
PROT SERPL-MCNC: 7.1 G/DL (ref 6.3–8)
PROT UR STRIP-MCNC: ABNORMAL MG/DL
RBC # BLD AUTO: 4.83 M/UL (ref 4.2–5.4)
RBC #/AREA URNS HPF: ABNORMAL /HPF (ref 0–2)
RENAL EPI CELLS #/AREA URNS HPF: ABNORMAL /HPF
SODIUM SERPL-SCNC: 138 MEQ/L (ref 135–144)
SP GR UR STRIP: 1.01 (ref 1–1.03)
TRIGLYCERIDE, FASTING: 135 MG/DL (ref 0–150)
UROBILINOGEN UR STRIP-ACNC: 0.2 E.U./DL
WBC # BLD AUTO: 8.1 K/UL (ref 4.8–10.8)
WBC #/AREA URNS AUTO: ABNORMAL /HPF (ref 0–5)

## 2025-03-28 RX ORDER — CIPROFLOXACIN 500 MG/1
500 TABLET, FILM COATED ORAL 2 TIMES DAILY
Qty: 14 TABLET | Refills: 0 | Status: SHIPPED | OUTPATIENT
Start: 2025-03-28 | End: 2025-04-04

## 2025-03-28 ASSESSMENT — ENCOUNTER SYMPTOMS
COUGH: 0
STRIDOR: 0
EYES NEGATIVE: 1
BLOOD IN STOOL: 0
GASTROINTESTINAL NEGATIVE: 1
RESPIRATORY NEGATIVE: 1
WHEEZING: 0
NAUSEA: 0
CHEST TIGHTNESS: 0
SHORTNESS OF BREATH: 0

## 2025-03-28 NOTE — PROGRESS NOTES
Subsequent Progress Note    Patient: Rosario Amado  YOB: 1960  MRN: [id number]    Chief Complaint: AF  Chief Complaint   Patient presents with    Other     Pradaxa was causing blood clots after urinating, blind spots in words, and lip throbbing        CV Data:  11/22 Echo EF 50%    Subjective/HPI: recent hosp for AF. She was able to sense AF. Had these in the past as well TSK and lytes WNL.  Pt did have DELGADO.     4/4/24 I have not seen pt in a while. No cp no sob no falls no bleed state she feels well. She did not get stress due to cost and insurance.     3/14/25 still w occ palps that are brief. Has DELGADO. No cp no falls no bleed.  Pt stopped all meds due to cost.     3/28/25 called in due to Hematuria on 3/24 - we stopped Pradaxa at that time and Hematuria stopped.  She denies Dysuria. No cp no sob no falls      EKG: SR 77    Former smoker  Occ etoh  Lives alone -   +FH  retired    Past Medical History:   Diagnosis Date    Hypertension     Obesity        Past Surgical History:   Procedure Laterality Date    CARPAL TUNNEL RELEASE      COLONOSCOPY N/A 07/22/2022    COLORECTAL CANCER SCREENING, NOT HIGH RISK performed by Rangel Perez MD at [facility]    TONSILLECTOMY      UPPER GASTROINTESTINAL ENDOSCOPY N/A 07/22/2022    EGD DIAGNOSTIC ONLY performed by Rangel Perez MD at [facility]       Family History   Problem Relation Age of Onset    Heart Attack Paternal Grandmother     Stroke Maternal Grandmother     Cancer Maternal Grandmother     Heart Disease Father     Hypertension Father     Diabetes Mother     Heart Disease Mother     Liver Cancer Brother     Diabetes Sister     Heart Defect Sister     Colon Cancer Neg Hx        Social History     Socioeconomic History    Marital status:    Tobacco Use    Smoking status: Former     Current packs/day: 0.00     Average packs/day: 1 pack/day for 7.0 years (7.0 ttl pk-yrs)     Types: Cigarettes     Start date: 1/1/1977     Quit date: 
Verbalized Understanding/Simple: Patient demonstrates quick and easy understanding/Returned Demonstration

## 2025-04-09 ENCOUNTER — OFFICE VISIT (OUTPATIENT)
Age: 65
End: 2025-04-09

## 2025-04-09 VITALS
TEMPERATURE: 97.5 F | HEART RATE: 69 BPM | DIASTOLIC BLOOD PRESSURE: 82 MMHG | HEIGHT: 63 IN | BODY MASS INDEX: 51.91 KG/M2 | WEIGHT: 293 LBS | OXYGEN SATURATION: 98 % | SYSTOLIC BLOOD PRESSURE: 124 MMHG

## 2025-04-09 DIAGNOSIS — E78.00 PURE HYPERCHOLESTEROLEMIA: ICD-10-CM

## 2025-04-09 DIAGNOSIS — J42 CHRONIC BRONCHITIS, UNSPECIFIED CHRONIC BRONCHITIS TYPE (HCC): ICD-10-CM

## 2025-04-09 DIAGNOSIS — R31.29 MICROSCOPIC HEMATURIA: ICD-10-CM

## 2025-04-09 DIAGNOSIS — I10 HYPERTENSION, UNSPECIFIED TYPE: Primary | ICD-10-CM

## 2025-04-09 LAB
BILIRUBIN, POC: NORMAL
BLOOD URINE, POC: NORMAL
CLARITY, POC: CLEAR
COLOR, POC: YELLOW
GLUCOSE URINE, POC: NORMAL MG/DL
KETONES, POC: NORMAL MG/DL
LEUKOCYTE EST, POC: NORMAL
NITRITE, POC: NORMAL
PH, POC: 6.5
PROTEIN, POC: NORMAL MG/DL
SPECIFIC GRAVITY, POC: 1.01
UROBILINOGEN, POC: 0.2 MG/DL

## 2025-04-09 RX ORDER — CEFUROXIME AXETIL 250 MG/1
250 TABLET ORAL 2 TIMES DAILY
Qty: 14 TABLET | Refills: 0 | Status: SHIPPED | OUTPATIENT
Start: 2025-04-09 | End: 2025-04-16

## 2025-04-09 RX ORDER — METOPROLOL TARTRATE 50 MG
50 TABLET ORAL 2 TIMES DAILY
Qty: 60 TABLET | Refills: 5 | Status: CANCELLED | OUTPATIENT
Start: 2025-04-09

## 2025-04-09 ASSESSMENT — ENCOUNTER SYMPTOMS
DIARRHEA: 0
VOMITING: 0

## 2025-04-09 ASSESSMENT — PATIENT HEALTH QUESTIONNAIRE - PHQ9
SUM OF ALL RESPONSES TO PHQ QUESTIONS 1-9: 0
2. FEELING DOWN, DEPRESSED OR HOPELESS: NOT AT ALL
1. LITTLE INTEREST OR PLEASURE IN DOING THINGS: NOT AT ALL
SUM OF ALL RESPONSES TO PHQ QUESTIONS 1-9: 0

## 2025-04-09 NOTE — PROGRESS NOTES
breath sounds.  No wheezes or rales.    Heart:rate reg. No murmur.  No gallops   Pulses:Radials 2+ equal               D.P  1+ equal  Extremities: trace pitting edema of both legs   Gen:   In no acute distress  Abdomen;  B.S present. Soft  Non tender. No hepatosplenomegaly. No masses  Back;  No CVA tenderness.      Assessment:       Diagnosis Orders   1. Hypertension, unspecified type        2. Pure hypercholesterolemia        3. Chronic bronchitis, unspecified chronic bronchitis type (HCC)        4. Microscopic hematuria  Culture, Urine    POCT Urinalysis no Micro    Culture, Urine             Plan:      Orders Placed This Encounter   Medications    cefUROXime (CEFTIN) 250 MG tablet     Sig: Take 1 tablet by mouth 2 times daily for 7 days     Dispense:  14 tablet     Refill:  0    Continue lopressor for htn and start lipitor for lipids   Orders Placed This Encounter   Procedures    Culture, Urine     Standing Status:   Future     Number of Occurrences:   1     Expected Date:   4/9/2025     Expiration Date:   4/9/2026    POCT Urinalysis no Micro    F/u in a week if needed other wise in 6 months

## 2025-04-10 LAB — BACTERIA UR CULT: NORMAL

## 2025-04-11 ENCOUNTER — TELEPHONE (OUTPATIENT)
Age: 65
End: 2025-04-11

## 2025-04-11 NOTE — TELEPHONE ENCOUNTER
[employer] Pt Assistance Program Update- Welcome Notice.    Effective 4/11/2025, pt will receive Xarelto from [facility] at no cost for up to one year.   Per Welcome letter- Pt has been informed of their acceptance into the program.    Xarelto Welcome Notice scanned into chart.

## 2025-04-12 ENCOUNTER — RESULTS FOLLOW-UP (OUTPATIENT)
Age: 65
End: 2025-04-12

## 2025-04-15 ENCOUNTER — TELEPHONE (OUTPATIENT)
Age: 65
End: 2025-04-15

## 2025-04-15 NOTE — TELEPHONE ENCOUNTER
Pt called in and would like to know if its alright that she takes the Cefuroxime with the rest of her cardiac medications. Please advise

## 2025-05-09 DIAGNOSIS — I10 HYPERTENSION, UNSPECIFIED TYPE: ICD-10-CM

## 2025-05-12 RX ORDER — METOPROLOL TARTRATE 50 MG
50 TABLET ORAL 2 TIMES DAILY
Qty: 60 TABLET | Refills: 5 | Status: SHIPPED | OUTPATIENT
Start: 2025-05-12

## 2025-05-12 NOTE — TELEPHONE ENCOUNTER
Requesting medication refill. Please approve or deny this request.    Rx requested:  Requested Prescriptions     Pending Prescriptions Disp Refills    metoprolol tartrate (LOPRESSOR) 50 MG tablet [Pharmacy Med Name: metoprolol tartrate 50 mg tablet] 60 tablet 5     Sig: Take 1 tablet by mouth 2 times daily         Last Office Visit:   3/28/2025      Next Visit Date:  Future Appointments   Date Time Provider Department Center   7/21/2025 12:00 PM Zohaib Frias MD [facility]   10/9/2025 10:00 AM Daniel Mckee MD [facility] DE[facility]               Please approve or deny.

## 2025-07-21 ENCOUNTER — OFFICE VISIT (OUTPATIENT)
Age: 65
End: 2025-07-21
Payer: MEDICARE

## 2025-07-21 VITALS
OXYGEN SATURATION: 95 % | HEART RATE: 72 BPM | SYSTOLIC BLOOD PRESSURE: 132 MMHG | RESPIRATION RATE: 15 BRPM | DIASTOLIC BLOOD PRESSURE: 80 MMHG | WEIGHT: 293 LBS | BODY MASS INDEX: 55.98 KG/M2

## 2025-07-21 DIAGNOSIS — I10 HYPERTENSION, UNSPECIFIED TYPE: ICD-10-CM

## 2025-07-21 PROCEDURE — 3075F SYST BP GE 130 - 139MM HG: CPT | Performed by: INTERNAL MEDICINE

## 2025-07-21 PROCEDURE — 1123F ACP DISCUSS/DSCN MKR DOCD: CPT | Performed by: INTERNAL MEDICINE

## 2025-07-21 PROCEDURE — 3079F DIAST BP 80-89 MM HG: CPT | Performed by: INTERNAL MEDICINE

## 2025-07-21 PROCEDURE — 99214 OFFICE O/P EST MOD 30 MIN: CPT | Performed by: INTERNAL MEDICINE

## 2025-07-21 RX ORDER — METOPROLOL TARTRATE 50 MG
50 TABLET ORAL 2 TIMES DAILY
Qty: 180 TABLET | Refills: 3 | Status: SHIPPED | OUTPATIENT
Start: 2025-07-21

## 2025-07-21 ASSESSMENT — ENCOUNTER SYMPTOMS
BLOOD IN STOOL: 0
CHEST TIGHTNESS: 0
EYES NEGATIVE: 1
GASTROINTESTINAL NEGATIVE: 1
COUGH: 0
RESPIRATORY NEGATIVE: 1
STRIDOR: 0
WHEEZING: 0
SHORTNESS OF BREATH: 0
NAUSEA: 0

## 2025-07-21 NOTE — PROGRESS NOTES
Subsequent Progress Note    Patient: Rosario Amado  YOB: 1960  MRN: [id number]    Chief Complaint: AF  Chief Complaint   Patient presents with    Follow-up     4mn follow up       CV Data:  11/22 Echo EF 50%    Subjective/HPI: recent hosp for AF. She was able to sense AF. Had these in the past as well TSK and lytes WNL.  Pt did have DELGADO.     4/4/24 I have not seen pt in a while. No cp no sob no falls no bleed state she feels well. She did not get stress due to cost and insurance.     3/14/25 still w occ palps that are brief. Has DELGADO. No cp no falls no bleed.  Pt stopped all meds due to cost.     3/28/25 called in due to Hematuria on 3/24 - we stopped Pradaxa at that time and Hematuria stopped.  She denies Dysuria. No cp no sob no falls     7/21/25 doing fine no cp no sob no falls no bleed      EKG: SR 77    Former smoker  Occ etoh  Lives alone -   +FH  retired    Past Medical History:   Diagnosis Date    Hypertension     Obesity        Past Surgical History:   Procedure Laterality Date    CARPAL TUNNEL RELEASE      COLONOSCOPY N/A 07/22/2022    COLORECTAL CANCER SCREENING, NOT HIGH RISK performed by Rangel Perez MD at [facility]    TONSILLECTOMY      UPPER GASTROINTESTINAL ENDOSCOPY N/A 07/22/2022    EGD DIAGNOSTIC ONLY performed by Rangel Perez MD at [facility]       Family History   Problem Relation Age of Onset    Heart Attack Paternal Grandmother     Stroke Maternal Grandmother     Cancer Maternal Grandmother     Heart Disease Father     Hypertension Father     Diabetes Mother     Heart Disease Mother     Liver Cancer Brother     Diabetes Sister     Heart Defect Sister     Colon Cancer Neg Hx        Social History     Socioeconomic History    Marital status:    Tobacco Use    Smoking status: Former     Current packs/day: 0.00     Average packs/day: 1 pack/day for 7.0 years (7.0 ttl pk-yrs)     Types: Cigarettes     Start date: 1/1/1977     Quit date: 1/1/1984     Years

## 2025-08-03 ENCOUNTER — HOSPITAL ENCOUNTER (OUTPATIENT)
Dept: SLEEP CENTER | Age: 65
Discharge: HOME | End: 2025-08-05
Attending: INTERNAL MEDICINE
Payer: COMMERCIAL

## 2025-08-03 PROCEDURE — 95810 POLYSOM 6/> YRS 4/> PARAM: CPT

## 2025-08-11 ENCOUNTER — TELEPHONE (OUTPATIENT)
Age: 65
End: 2025-08-11

## 2025-08-11 DIAGNOSIS — G47.33 OSA (OBSTRUCTIVE SLEEP APNEA): Primary | ICD-10-CM

## 2025-08-15 PROBLEM — G47.33 OSA (OBSTRUCTIVE SLEEP APNEA): Status: ACTIVE | Noted: 2025-08-15

## 2025-08-25 ENCOUNTER — HOSPITAL ENCOUNTER (OUTPATIENT)
Dept: NUCLEAR MEDICINE | Age: 65
Discharge: HOME | End: 2025-08-27
Attending: INTERNAL MEDICINE
Payer: MEDICARE

## 2025-08-25 ENCOUNTER — HOSPITAL ENCOUNTER (OUTPATIENT)
Age: 65
Discharge: HOME | End: 2025-08-27
Attending: INTERNAL MEDICINE

## 2025-08-25 DIAGNOSIS — R06.02 SHORTNESS OF BREATH: ICD-10-CM

## 2025-08-25 PROCEDURE — 3430000000 HC RX DIAGNOSTIC RADIOPHARMACEUTICAL: Performed by: INTERNAL MEDICINE

## 2025-08-25 PROCEDURE — 78452 HT MUSCLE IMAGE SPECT MULT: CPT

## 2025-08-25 PROCEDURE — 6360000002 HC RX W HCPCS: Performed by: INTERNAL MEDICINE

## 2025-08-25 PROCEDURE — 2500000003 HC RX 250 WO HCPCS: Performed by: INTERNAL MEDICINE

## 2025-08-25 PROCEDURE — 93017 CV STRESS TEST TRACING ONLY: CPT

## 2025-08-25 PROCEDURE — A9502 TC99M TETROFOSMIN: HCPCS | Performed by: INTERNAL MEDICINE

## 2025-08-25 RX ORDER — REGADENOSON 0.08 MG/ML
0.4 INJECTION, SOLUTION INTRAVENOUS
Status: COMPLETED | OUTPATIENT
Start: 2025-08-25 | End: 2025-08-25

## 2025-08-25 RX ORDER — SODIUM CHLORIDE 0.9 % (FLUSH) 0.9 %
10 SYRINGE (ML) INJECTION PRN
Status: DISCONTINUED | OUTPATIENT
Start: 2025-08-25 | End: 2025-08-28 | Stop reason: HOSPADM

## 2025-08-25 RX ADMIN — SODIUM CHLORIDE, PRESERVATIVE FREE 10 ML: 5 INJECTION INTRAVENOUS at 09:48

## 2025-08-25 RX ADMIN — SODIUM CHLORIDE, PRESERVATIVE FREE 10 ML: 5 INJECTION INTRAVENOUS at 09:50

## 2025-08-25 RX ADMIN — TETROFOSMIN 33.4 MILLICURIE: 1.38 INJECTION, POWDER, LYOPHILIZED, FOR SOLUTION INTRAVENOUS at 09:49

## 2025-08-25 RX ADMIN — REGADENOSON 0.4 MG: 0.08 INJECTION, SOLUTION INTRAVENOUS at 09:49

## 2025-08-26 ENCOUNTER — HOSPITAL ENCOUNTER (OUTPATIENT)
Dept: NUCLEAR MEDICINE | Age: 65
Discharge: HOME | End: 2025-08-28
Attending: INTERNAL MEDICINE
Payer: MEDICARE

## 2025-08-26 LAB
NUC STRESS EJECTION FRACTION: 63 %
STRESS BASELINE DIAS BP: 76 MMHG
STRESS BASELINE HR: 82 BPM
STRESS BASELINE ST DEPRESSION: 0 MM
STRESS BASELINE SYS BP: 138 MMHG
STRESS ESTIMATED WORKLOAD: 1 METS
STRESS PEAK DIAS BP: 146 MMHG
STRESS PEAK SYS BP: 189 MMHG
STRESS PERCENT HR ACHIEVED: 70 %
STRESS POST PEAK HR: 108 BPM
STRESS RATE PRESSURE PRODUCT: NORMAL BPM*MMHG
STRESS ST DEPRESSION: 0 MM
STRESS TARGET HR: 155 BPM
TID: 1.13

## 2025-08-26 PROCEDURE — 93018 CV STRESS TEST I&R ONLY: CPT | Performed by: INTERNAL MEDICINE

## 2025-08-26 PROCEDURE — 78452 HT MUSCLE IMAGE SPECT MULT: CPT | Performed by: INTERNAL MEDICINE

## 2025-08-26 PROCEDURE — 93016 CV STRESS TEST SUPVJ ONLY: CPT | Performed by: INTERNAL MEDICINE

## 2025-08-26 PROCEDURE — A9502 TC99M TETROFOSMIN: HCPCS | Performed by: INTERNAL MEDICINE

## 2025-08-26 PROCEDURE — 3430000000 HC RX DIAGNOSTIC RADIOPHARMACEUTICAL: Performed by: INTERNAL MEDICINE

## 2025-08-26 RX ADMIN — TETROFOSMIN 36 MILLICURIE: 1.38 INJECTION, POWDER, LYOPHILIZED, FOR SOLUTION INTRAVENOUS at 09:52

## 2025-10-07 ENCOUNTER — APPOINTMENT (OUTPATIENT)
Dept: CARDIOLOGY | Facility: CLINIC | Age: 65
End: 2025-10-07
Payer: MEDICARE

## (undated) DEVICE — SNARE ENDOSCP AD L240CM LOOP W10MM SHTH DIA2.4MM RND INSUL

## (undated) DEVICE — PATIENT RETURN ELECTRODE, SINGLE-USE, NON CONTACT QUALITY MONITORING, ADULT, WITH 9 FT (2.7 M) CORD, FOR PATIENTS WEIGHING OVER 33LBS. (15KG): Brand: MEGADYNE

## (undated) DEVICE — TUBING, SUCTION, 1/4" X 10', STRAIGHT: Brand: MEDLINE

## (undated) DEVICE — Device: Brand: ENDO SMARTCAP

## (undated) DEVICE — BRUSH ENDO CLN L90.5IN SHTH DIA1.7MM BRIST DIA5-7MM 2-6MM

## (undated) DEVICE — SINGLE PORT MANIFOLD: Brand: NEPTUNE 2

## (undated) DEVICE — TRAP POLYP BALEEN

## (undated) DEVICE — TUBE SET 96 MM 64 MM H2O PERISTALTIC STD AUX CHANNEL

## (undated) DEVICE — FORCEPS BX L240CM JAW DIA2.8MM L CAP W/ NDL MIC MESH TOOTH